# Patient Record
Sex: FEMALE | Race: WHITE | NOT HISPANIC OR LATINO | Employment: UNEMPLOYED | URBAN - METROPOLITAN AREA
[De-identification: names, ages, dates, MRNs, and addresses within clinical notes are randomized per-mention and may not be internally consistent; named-entity substitution may affect disease eponyms.]

---

## 2018-04-23 LAB
EXTERNAL HEPATITIS B SURFACE ANTIGEN: NEGATIVE
EXTERNAL RUBELLA QUALITATIVE: NORMAL
EXTERNAL SYPHILIS RPR SCREEN: NORMAL
HIV1 P24 AG SERPL QL IA: NORMAL

## 2018-11-12 LAB — EXTERNAL GROUP B STREP ANTIGEN: POSITIVE

## 2018-12-03 ENCOUNTER — APPOINTMENT (OUTPATIENT)
Dept: ULTRASOUND IMAGING | Facility: HOSPITAL | Age: 33
End: 2018-12-03

## 2018-12-03 ENCOUNTER — HOSPITAL ENCOUNTER (OUTPATIENT)
Facility: HOSPITAL | Age: 33
Setting detail: OBSERVATION
Discharge: HOME | End: 2018-12-03
Attending: OBSTETRICS & GYNECOLOGY | Admitting: OBSTETRICS & GYNECOLOGY

## 2018-12-03 VITALS — WEIGHT: 169 LBS | TEMPERATURE: 98.6 F | RESPIRATION RATE: 18 BRPM | BODY MASS INDEX: 27.16 KG/M2 | HEIGHT: 66 IN

## 2018-12-03 PROBLEM — Z34.90 PREGNANCY: Status: ACTIVE | Noted: 2018-12-03

## 2018-12-03 PROCEDURE — 96360 HYDRATION IV INFUSION INIT: CPT

## 2018-12-03 PROCEDURE — G0378 HOSPITAL OBSERVATION PER HR: HCPCS

## 2018-12-03 PROCEDURE — 76819 FETAL BIOPHYS PROFIL W/O NST: CPT

## 2018-12-03 PROCEDURE — 96361 HYDRATE IV INFUSION ADD-ON: CPT

## 2018-12-03 RX ORDER — PRENATAL VIT NO.126/IRON/FOLIC 28MG-0.8MG
1 TABLET ORAL DAILY
COMMUNITY
End: 2023-02-15

## 2018-12-03 RX ORDER — SERTRALINE HYDROCHLORIDE 25 MG/1
25 TABLET, FILM COATED ORAL DAILY
COMMUNITY
End: 2019-05-18

## 2018-12-03 RX ORDER — SODIUM CHLORIDE, SODIUM LACTATE, POTASSIUM CHLORIDE, CALCIUM CHLORIDE 600; 310; 30; 20 MG/100ML; MG/100ML; MG/100ML; MG/100ML
125 INJECTION, SOLUTION INTRAVENOUS CONTINUOUS
Status: DISCONTINUED | OUTPATIENT
Start: 2018-12-03 | End: 2018-12-03 | Stop reason: HOSPADM

## 2018-12-03 RX ORDER — OMEGA-3S/DHA/EPA/FISH OIL/D3 300MG-1000
400 CAPSULE ORAL DAILY
COMMUNITY
End: 2021-02-05

## 2018-12-03 RX ADMIN — SODIUM CHLORIDE, POTASSIUM CHLORIDE, SODIUM LACTATE AND CALCIUM CHLORIDE 125 ML/HR: 600; 310; 30; 20 INJECTION, SOLUTION INTRAVENOUS at 11:45

## 2018-12-03 NOTE — NURSING NOTE
Patient provided discharge instructions, and verbalizes understanding. Aware to return to triage for changes or concerns. Encouraged to keep prenatal appointments. Patient appears comfortable and ambulated off unit.

## 2018-12-03 NOTE — PROGRESS NOTES
Reviewed normal tracing in last hour.  Pt and  still prefer not to be induced.  Discussed [facility] if note a decrease in movement.  Also, labor warnings given, as well as to return for ROM or bleeding.  Will allow to go home and return to office 12/5 at 2 pm.      Ninfa Au MD  12/03/18  3:35 PM

## 2018-12-03 NOTE — PROGRESS NOTES
Pt sent from office with an audible decel.  Monitoring in-office with low baseline.  Sent for prolonged monitoring.  Cervix 60/1/-2.      NST here with good BTBV- one possible late decel.  An early may be present but discontinuous tracing.      Discussed with pt and  two options- keep for IOL since term and question on tracing vs. Continued monitoring with reassurance of normal BPP.  Explained if we see evidence of fetal compromise would want to keep and induce.  They really are not inclined to agree to IOL.  Pt having mild contractions per monitor, but not uncomfortable and not aware.      BPP is ordered but will keep on monitor otherwise.      Ninfa Au MD  12/03/18  1:14 PM

## 2018-12-03 NOTE — PROGRESS NOTES
"BPP 8/8, FAHAD 16.9.    NST now with great BTBV.  No decels in first 20 min on monitor.  Will allow to drink and eat.  Watch at least another hour.  Once again, they prefer no IOL.  Will note FM- they state baby is \"always\" active.  To notify if anything changes. Then would see in office 12/5 with BPP.  They understand that we usually deliver by 41 weeks.  There are painless contractions today.  Would hope for labor.    Ninfa Au MD  12/03/18  2:38 PM    "

## 2018-12-07 ENCOUNTER — ANESTHESIA (OUTPATIENT)
Dept: LABOR AND DELIVERY | Facility: HOSPITAL | Age: 33
End: 2018-12-07

## 2018-12-07 ENCOUNTER — ANESTHESIA EVENT (OUTPATIENT)
Dept: LABOR AND DELIVERY | Facility: HOSPITAL | Age: 33
End: 2018-12-07

## 2018-12-07 ENCOUNTER — HOSPITAL ENCOUNTER (INPATIENT)
Facility: HOSPITAL | Age: 33
LOS: 2 days | Discharge: HOME | End: 2018-12-09
Attending: OBSTETRICS & GYNECOLOGY | Admitting: OBSTETRICS & GYNECOLOGY

## 2018-12-07 PROBLEM — Z34.90 PREGNANCY: Status: RESOLVED | Noted: 2018-12-03 | Resolved: 2018-12-07

## 2018-12-07 LAB
ABO GROUP BLD: NORMAL
ATMOSPHERIC PRESS: 763.6 MMHG
BASE EXCESS BLDCOV CALC-SCNC: -2.7 MMOL/L (ref -30–30)
BASOPHILS # BLD AUTO: 0.03 10*3/MM3 (ref 0–0.2)
BASOPHILS NFR BLD AUTO: 0.2 % (ref 0–1.5)
BDY SITE: ABNORMAL
BLD GP AB SCN SERPL QL: NEGATIVE
DEPRECATED RDW RBC AUTO: 45.3 FL (ref 37–54)
EOSINOPHIL # BLD AUTO: 0.02 10*3/MM3 (ref 0–0.7)
EOSINOPHIL NFR BLD AUTO: 0.1 % (ref 0.3–6.2)
ERYTHROCYTE [DISTWIDTH] IN BLOOD BY AUTOMATED COUNT: 12.5 % (ref 11.7–13)
HCO3 BLDCOV-SCNC: 23.2 MMOL/L
HCT VFR BLD AUTO: 40.5 % (ref 35.6–45.5)
HGB BLD-MCNC: 13 G/DL (ref 11.9–15.5)
IMM GRANULOCYTES # BLD: 0.05 10*3/MM3 (ref 0–0.03)
IMM GRANULOCYTES NFR BLD: 0.3 % (ref 0–0.5)
LYMPHOCYTES # BLD AUTO: 1.46 10*3/MM3 (ref 0.9–4.8)
LYMPHOCYTES NFR BLD AUTO: 9.5 % (ref 19.6–45.3)
MCH RBC QN AUTO: 31.8 PG (ref 26.9–32)
MCHC RBC AUTO-ENTMCNC: 32.1 G/DL (ref 32.4–36.3)
MCV RBC AUTO: 99 FL (ref 80.5–98.2)
MODALITY: ABNORMAL
MONOCYTES # BLD AUTO: 0.72 10*3/MM3 (ref 0.2–1.2)
MONOCYTES NFR BLD AUTO: 4.7 % (ref 5–12)
NEUTROPHILS # BLD AUTO: 13.1 10*3/MM3 (ref 1.9–8.1)
NEUTROPHILS NFR BLD AUTO: 85.2 % (ref 42.7–76)
NOTE: ABNORMAL
PCO2 BLDCOV: 43.2 MM HG (ref 35–51.3)
PH BLDCOV: 7.34 PH UNITS (ref 7.26–7.4)
PLATELET # BLD AUTO: 221 10*3/MM3 (ref 140–500)
PMV BLD AUTO: 11.4 FL (ref 6–12)
PO2 BLDCOV: 18.7 MM HG (ref 19–39)
RBC # BLD AUTO: 4.09 10*6/MM3 (ref 3.9–5.2)
RH BLD: POSITIVE
SAO2 % BLDCOA: 25.5 % (ref 92–99)
SAO2 % BLDCOV: ABNORMAL %
T&S EXPIRATION DATE: NORMAL
WBC NRBC COR # BLD: 15.38 10*3/MM3 (ref 4.5–10.7)

## 2018-12-07 PROCEDURE — 86901 BLOOD TYPING SEROLOGIC RH(D): CPT | Performed by: OBSTETRICS & GYNECOLOGY

## 2018-12-07 PROCEDURE — 85025 COMPLETE CBC W/AUTO DIFF WBC: CPT | Performed by: OBSTETRICS & GYNECOLOGY

## 2018-12-07 PROCEDURE — 25010000002 PENICILLIN G POTASSIUM PER 600000 UNITS: Performed by: OBSTETRICS & GYNECOLOGY

## 2018-12-07 PROCEDURE — 86850 RBC ANTIBODY SCREEN: CPT | Performed by: OBSTETRICS & GYNECOLOGY

## 2018-12-07 PROCEDURE — 82803 BLOOD GASES ANY COMBINATION: CPT

## 2018-12-07 PROCEDURE — 10907ZC DRAINAGE OF AMNIOTIC FLUID, THERAPEUTIC FROM PRODUCTS OF CONCEPTION, VIA NATURAL OR ARTIFICIAL OPENING: ICD-10-PCS | Performed by: OBSTETRICS & GYNECOLOGY

## 2018-12-07 PROCEDURE — 86900 BLOOD TYPING SEROLOGIC ABO: CPT | Performed by: OBSTETRICS & GYNECOLOGY

## 2018-12-07 PROCEDURE — C1755 CATHETER, INTRASPINAL: HCPCS | Performed by: ANESTHESIOLOGY

## 2018-12-07 PROCEDURE — 0KQM0ZZ REPAIR PERINEUM MUSCLE, OPEN APPROACH: ICD-10-PCS | Performed by: OBSTETRICS & GYNECOLOGY

## 2018-12-07 RX ORDER — PENICILLIN G 3000000 [IU]/50ML
3 INJECTION, SOLUTION INTRAVENOUS EVERY 4 HOURS
Status: DISCONTINUED | OUTPATIENT
Start: 2018-12-07 | End: 2018-12-08 | Stop reason: HOSPADM

## 2018-12-07 RX ORDER — FAMOTIDINE 10 MG/ML
20 INJECTION, SOLUTION INTRAVENOUS ONCE AS NEEDED
Status: DISCONTINUED | OUTPATIENT
Start: 2018-12-07 | End: 2018-12-08 | Stop reason: HOSPADM

## 2018-12-07 RX ORDER — CARBOPROST TROMETHAMINE 250 UG/ML
250 INJECTION, SOLUTION INTRAMUSCULAR AS NEEDED
Status: DISCONTINUED | OUTPATIENT
Start: 2018-12-07 | End: 2018-12-08 | Stop reason: HOSPADM

## 2018-12-07 RX ORDER — ONDANSETRON 4 MG/1
4 TABLET, ORALLY DISINTEGRATING ORAL EVERY 6 HOURS PRN
Status: DISCONTINUED | OUTPATIENT
Start: 2018-12-07 | End: 2018-12-08 | Stop reason: HOSPADM

## 2018-12-07 RX ORDER — METHYLERGONOVINE MALEATE 0.2 MG/ML
200 INJECTION INTRAVENOUS ONCE AS NEEDED
Status: DISCONTINUED | OUTPATIENT
Start: 2018-12-07 | End: 2018-12-08 | Stop reason: HOSPADM

## 2018-12-07 RX ORDER — OXYTOCIN-SODIUM CHLORIDE 0.9% IV SOLN 30 UNIT/500ML 30-0.9/5 UT/ML-%
999 SOLUTION INTRAVENOUS ONCE
Status: COMPLETED | OUTPATIENT
Start: 2018-12-07 | End: 2018-12-07

## 2018-12-07 RX ORDER — ONDANSETRON 4 MG/1
4 TABLET, FILM COATED ORAL EVERY 6 HOURS PRN
Status: DISCONTINUED | OUTPATIENT
Start: 2018-12-07 | End: 2018-12-08 | Stop reason: HOSPADM

## 2018-12-07 RX ORDER — ONDANSETRON 2 MG/ML
4 INJECTION INTRAMUSCULAR; INTRAVENOUS ONCE AS NEEDED
Status: DISCONTINUED | OUTPATIENT
Start: 2018-12-07 | End: 2018-12-08 | Stop reason: HOSPADM

## 2018-12-07 RX ORDER — PHYTONADIONE 1 MG/.5ML
INJECTION, EMULSION INTRAMUSCULAR; INTRAVENOUS; SUBCUTANEOUS
Status: DISPENSED
Start: 2018-12-07 | End: 2018-12-08

## 2018-12-07 RX ORDER — ONDANSETRON 2 MG/ML
4 INJECTION INTRAMUSCULAR; INTRAVENOUS EVERY 6 HOURS PRN
Status: DISCONTINUED | OUTPATIENT
Start: 2018-12-07 | End: 2018-12-08 | Stop reason: HOSPADM

## 2018-12-07 RX ORDER — SODIUM CHLORIDE, SODIUM LACTATE, POTASSIUM CHLORIDE, CALCIUM CHLORIDE 600; 310; 30; 20 MG/100ML; MG/100ML; MG/100ML; MG/100ML
125 INJECTION, SOLUTION INTRAVENOUS CONTINUOUS
Status: DISCONTINUED | OUTPATIENT
Start: 2018-12-07 | End: 2018-12-09 | Stop reason: HOSPADM

## 2018-12-07 RX ORDER — MISOPROSTOL 200 UG/1
800 TABLET ORAL AS NEEDED
Status: DISCONTINUED | OUTPATIENT
Start: 2018-12-07 | End: 2018-12-08 | Stop reason: HOSPADM

## 2018-12-07 RX ORDER — TERBUTALINE SULFATE 1 MG/ML
0.25 INJECTION, SOLUTION SUBCUTANEOUS AS NEEDED
Status: DISCONTINUED | OUTPATIENT
Start: 2018-12-07 | End: 2018-12-08 | Stop reason: HOSPADM

## 2018-12-07 RX ORDER — EPHEDRINE SULFATE 50 MG/ML
5 INJECTION, SOLUTION INTRAVENOUS AS NEEDED
Status: DISCONTINUED | OUTPATIENT
Start: 2018-12-07 | End: 2018-12-08 | Stop reason: HOSPADM

## 2018-12-07 RX ORDER — OXYTOCIN-SODIUM CHLORIDE 0.9% IV SOLN 30 UNIT/500ML 30-0.9/5 UT/ML-%
250 SOLUTION INTRAVENOUS CONTINUOUS
Status: ACTIVE | OUTPATIENT
Start: 2018-12-07 | End: 2018-12-07

## 2018-12-07 RX ORDER — OXYTOCIN-SODIUM CHLORIDE 0.9% IV SOLN 30 UNIT/500ML 30-0.9/5 UT/ML-%
125 SOLUTION INTRAVENOUS CONTINUOUS PRN
Status: COMPLETED | OUTPATIENT
Start: 2018-12-07 | End: 2018-12-07

## 2018-12-07 RX ORDER — ERYTHROMYCIN 5 MG/G
OINTMENT OPHTHALMIC
Status: DISPENSED
Start: 2018-12-07 | End: 2018-12-08

## 2018-12-07 RX ADMIN — PENICILLIN G 3 MILLION UNITS: 3000000 INJECTION, SOLUTION INTRAVENOUS at 19:47

## 2018-12-07 RX ADMIN — SODIUM CHLORIDE, POTASSIUM CHLORIDE, SODIUM LACTATE AND CALCIUM CHLORIDE 125 ML/HR: 600; 310; 30; 20 INJECTION, SOLUTION INTRAVENOUS at 09:36

## 2018-12-07 RX ADMIN — PENICILLIN G 3 MILLION UNITS: 3000000 INJECTION, SOLUTION INTRAVENOUS at 09:36

## 2018-12-07 RX ADMIN — SODIUM CHLORIDE, POTASSIUM CHLORIDE, SODIUM LACTATE AND CALCIUM CHLORIDE 125 ML/HR: 600; 310; 30; 20 INJECTION, SOLUTION INTRAVENOUS at 05:38

## 2018-12-07 RX ADMIN — OXYTOCIN 125 ML/HR: 10 INJECTION, SOLUTION INTRAMUSCULAR; INTRAVENOUS at 22:43

## 2018-12-07 RX ADMIN — PENICILLIN G 3 MILLION UNITS: 3000000 INJECTION, SOLUTION INTRAVENOUS at 17:59

## 2018-12-07 RX ADMIN — Medication 8 ML/HR: at 11:00

## 2018-12-07 RX ADMIN — OXYTOCIN 999 ML/HR: 10 INJECTION, SOLUTION INTRAMUSCULAR; INTRAVENOUS at 22:06

## 2018-12-07 RX ADMIN — PENICILLIN G 3 MILLION UNITS: 3000000 INJECTION, SOLUTION INTRAVENOUS at 13:43

## 2018-12-07 RX ADMIN — SODIUM CHLORIDE 5 MILLION UNITS: 900 INJECTION INTRAVENOUS at 05:37

## 2018-12-07 RX ADMIN — SODIUM CHLORIDE, POTASSIUM CHLORIDE, SODIUM LACTATE AND CALCIUM CHLORIDE 125 ML/HR: 600; 310; 30; 20 INJECTION, SOLUTION INTRAVENOUS at 12:49

## 2018-12-07 NOTE — PLAN OF CARE
Problem: Patient Care Overview  Goal: Plan of Care Review  Outcome: Ongoing (interventions implemented as appropriate)   12/07/18 0604 12/07/18 1822   Coping/Psychosocial   Plan of Care Reviewed With patient;spouse --    Plan of Care Review   Progress improving --    OTHER   Outcome Summary --  pt making cervical change. Pain 0/10. will start pushing soon.      Goal: Individualization and Mutuality  Outcome: Ongoing (interventions implemented as appropriate)   12/07/18 0604   Individualization   Patient Specific Preferences MAN, breastfeeding, birthing ball, possible NCB   Patient Specific Goals (Include Timeframe) healthy mom and baby   Patient Specific Interventions MAN during recovery, birthing ball if wanted   Mutuality/Individual Preferences   What Anxieties, Fears, Concerns, or Questions Do You Have About Your Care? labor process, pain control   How Would You and/or Your Support Person Like to Participate in Your Care? FOB in room for delivery, FOB to cut cord   Mutuality/Individual Preferences   How to Address Anxieties/Fears keep informed, education     Goal: Discharge Needs Assessment  Outcome: Ongoing (interventions implemented as appropriate)   12/07/18 0604   Discharge Needs Assessment   Readmission Within the Last 30 Days no previous admission in last 30 days   Concerns to be Addressed no discharge needs identified   Patient/Family Anticipates Transition to home;home with family   Patient/Family Anticipated Services at Transition none   Transportation Concerns car, none   Transportation Anticipated car, drives self;family or friend will provide   Anticipated Changes Related to Illness none   Equipment Needed After Discharge none   Disability   Equipment Currently Used at Home none       Problem: Labor (Cervical Ripen, Induct, Augment) (Adult,Obstetrics,Pediatric)  Goal: Signs and Symptoms of Listed Potential Problems Will be Absent, Minimized or Managed (Labor)  Outcome: Ongoing (interventions implemented  as appropriate)   18 06   Goal/Outcome Evaluation   Problems Assessed (Labor) all   Problems Present (Labor) intrapartum bleeding;pain       Problem: Skin Injury Risk (Adult)  Goal: Identify Related Risk Factors and Signs and Symptoms  Outcome: Ongoing (interventions implemented as appropriate)   18 06   Skin Injury Risk (Adult)   Related Risk Factors (Skin Injury Risk) mobility impaired     Goal: Skin Health and Integrity  Outcome: Ongoing (interventions implemented as appropriate)   18   Skin Injury Risk (Adult)   Skin Health and Integrity making progress toward outcome       Problem: Fall Risk,  (Adult,Obstetrics,Pediatric)  Goal: Identify Related Risk Factors and Signs and Symptoms  Outcome: Ongoing (interventions implemented as appropriate)   18 06   Fall Risk,  (Adult,Obstetrics,Pediatric)   Related Risk Factors (Fall Risk, ) depression;fatigue;pain severe;unable to visualize feet   Signs and Symptoms (Fall Risk, ) presence of fall risk factors     Goal: Absence of Maternal Fall  Outcome: Ongoing (interventions implemented as appropriate)   18 1822   Fall Risk,  (Adult,Obstetrics,Pediatric)   Absence of Maternal Fall making progress toward outcome

## 2018-12-07 NOTE — ANESTHESIA PREPROCEDURE EVALUATION
Anesthesia Evaluation     Patient summary reviewed and Nursing notes reviewed                Airway   Mallampati: II  TM distance: >3 FB  Neck ROM: full  Dental - normal exam     Pulmonary - negative pulmonary ROS and normal exam    breath sounds clear to auscultation  Cardiovascular - negative cardio ROS and normal exam  Exercise tolerance: good (4-7 METS)    Rhythm: regular  Rate: normal    (-) angina, orthopnea, PND, DAVIDSON      Neuro/Psych- negative ROS  GI/Hepatic/Renal/Endo - negative ROS     Musculoskeletal (-) negative ROS    Abdominal    Substance History - negative use     OB/GYN    (+) Pregnant,         Other - negative ROS                       Anesthesia Plan    ASA 2     epidural     Anesthetic plan, all risks, benefits, and alternatives have been provided, discussed and informed consent has been obtained with: patient.

## 2018-12-07 NOTE — PLAN OF CARE
Problem: Patient Care Overview  Goal: Plan of Care Review  Outcome: Ongoing (interventions implemented as appropriate)   12/07/18 0604   Coping/Psychosocial   Plan of Care Reviewed With patient;spouse   Plan of Care Review   Progress improving   OTHER   Outcome Summary SVE at 0520 showed pt. to be 4-5/90/-1. Pt. would like to try to have NCB.     Goal: Individualization and Mutuality  Outcome: Ongoing (interventions implemented as appropriate)   12/07/18 0604   Individualization   Patient Specific Preferences MAN, breastfeeding, birthing ball, possible NCB   Patient Specific Goals (Include Timeframe) healthy mom and baby   Patient Specific Interventions MAN during recovery, birthing ball if wanted   Mutuality/Individual Preferences   What Anxieties, Fears, Concerns, or Questions Do You Have About Your Care? labor process, pain control   How Would You and/or Your Support Person Like to Participate in Your Care? FOB in room for delivery, FOB to cut cord   Mutuality/Individual Preferences   How to Address Anxieties/Fears keep informed, education     Goal: Discharge Needs Assessment  Outcome: Ongoing (interventions implemented as appropriate)   12/07/18 0604   Discharge Needs Assessment   Readmission Within the Last 30 Days no previous admission in last 30 days   Concerns to be Addressed no discharge needs identified   Patient/Family Anticipates Transition to home;home with family   Patient/Family Anticipated Services at Transition none   Transportation Concerns car, none   Transportation Anticipated car, drives self;family or friend will provide   Anticipated Changes Related to Illness none   Equipment Needed After Discharge none   Disability   Equipment Currently Used at Home none     Goal: Interprofessional Rounds/Family Conf  Outcome: Ongoing (interventions implemented as appropriate)   12/07/18 0604   Interdisciplinary Rounds/Family Conf   Participants nursing;patient;other (see comments)  (spouse)       Problem:  Labor (Cervical Ripen, Induct, Augment) (Adult,Obstetrics,Pediatric)  Goal: Signs and Symptoms of Listed Potential Problems Will be Absent, Minimized or Managed (Labor)  Outcome: Ongoing (interventions implemented as appropriate)   18   Goal/Outcome Evaluation   Problems Assessed (Labor) all   Problems Present (Labor) intrapartum bleeding;pain       Problem: Skin Injury Risk (Adult)  Goal: Identify Related Risk Factors and Signs and Symptoms  Outcome: Ongoing (interventions implemented as appropriate)   18   Skin Injury Risk (Adult)   Related Risk Factors (Skin Injury Risk) mobility impaired     Goal: Skin Health and Integrity  Outcome: Ongoing (interventions implemented as appropriate)   18   Skin Injury Risk (Adult)   Skin Health and Integrity making progress toward outcome       Problem: Fall Risk,  (Adult,Obstetrics,Pediatric)  Goal: Identify Related Risk Factors and Signs and Symptoms  Outcome: Ongoing (interventions implemented as appropriate)   18   Fall Risk,  (Adult,Obstetrics,Pediatric)   Related Risk Factors (Fall Risk, ) depression;fatigue;pain severe;unable to visualize feet   Signs and Symptoms (Fall Risk, ) presence of fall risk factors     Goal: Absence of Maternal Fall  Outcome: Ongoing (interventions implemented as appropriate)   18   Fall Risk,  (Adult,Obstetrics,Pediatric)   Absence of Maternal Fall making progress toward outcome

## 2018-12-07 NOTE — ANESTHESIA PROCEDURE NOTES
Labor Epidural    Pre-sedation assessment completed: 12/7/2018 10:54 AM    Patient reassessed immediately prior to procedure    Patient location during procedure: OB  Start Time: 12/7/2018 10:55 AM  Stop Time: 12/7/2018 10:57 AM  Performed By  Anesthesiologist: Arnoldo Barlow MD  Preanesthetic Checklist  Completed: patient identified, site marked, surgical consent, pre-op evaluation, timeout performed, IV checked, risks and benefits discussed and monitors and equipment checked  Additional Notes  Labor epidural using Arrow kit, no heme/CSF aspirated, no paraesthesias.  Test dose with 3cc 1.5% lido with epi is negative.    Prep:  Pt Position:sitting  Sterile Tech:cap, gloves, mask and sterile barrier  Prep:chlorhexidine gluconate and isopropyl alcohol  Monitoring:blood pressure monitoring, continuous pulse oximetry and EKG  Epidural Block Procedure:  Approach:midline  Guidance:landmark technique and palpation technique  Location:L3-L4  Needle Type:Tuohy  Needle Gauge:17  Loss of Resistance Medium: saline  Loss of Resistance: 5cm  Cath Depth at skin:10 cm  Paresthesia: none  Aspiration:negative  Test Dose:negative  Number of Attempts: 1  Post Assessment:  Dressing:occlusive dressing applied and secured with tape  Pt Tolerance:patient tolerated the procedure well with no apparent complications  Complications:no

## 2018-12-08 LAB
BASOPHILS # BLD AUTO: 0.02 10*3/MM3 (ref 0–0.2)
BASOPHILS NFR BLD AUTO: 0.1 % (ref 0–1.5)
DEPRECATED RDW RBC AUTO: 43.9 FL (ref 37–54)
EOSINOPHIL # BLD AUTO: 0.02 10*3/MM3 (ref 0–0.7)
EOSINOPHIL NFR BLD AUTO: 0.1 % (ref 0.3–6.2)
ERYTHROCYTE [DISTWIDTH] IN BLOOD BY AUTOMATED COUNT: 12.7 % (ref 11.7–13)
HCT VFR BLD AUTO: 31.9 % (ref 35.6–45.5)
HGB BLD-MCNC: 10.4 G/DL (ref 11.9–15.5)
IMM GRANULOCYTES # BLD: 0.04 10*3/MM3 (ref 0–0.03)
IMM GRANULOCYTES NFR BLD: 0.2 % (ref 0–0.5)
LYMPHOCYTES # BLD AUTO: 1.66 10*3/MM3 (ref 0.9–4.8)
LYMPHOCYTES NFR BLD AUTO: 9.6 % (ref 19.6–45.3)
MCH RBC QN AUTO: 31.1 PG (ref 26.9–32)
MCHC RBC AUTO-ENTMCNC: 32.6 G/DL (ref 32.4–36.3)
MCV RBC AUTO: 95.5 FL (ref 80.5–98.2)
MONOCYTES # BLD AUTO: 0.77 10*3/MM3 (ref 0.2–1.2)
MONOCYTES NFR BLD AUTO: 4.5 % (ref 5–12)
NEUTROPHILS # BLD AUTO: 14.77 10*3/MM3 (ref 1.9–8.1)
NEUTROPHILS NFR BLD AUTO: 85.7 % (ref 42.7–76)
PLATELET # BLD AUTO: 181 10*3/MM3 (ref 140–500)
PMV BLD AUTO: 11.5 FL (ref 6–12)
RBC # BLD AUTO: 3.34 10*6/MM3 (ref 3.9–5.2)
WBC NRBC COR # BLD: 17.24 10*3/MM3 (ref 4.5–10.7)

## 2018-12-08 PROCEDURE — 85025 COMPLETE CBC W/AUTO DIFF WBC: CPT | Performed by: OBSTETRICS & GYNECOLOGY

## 2018-12-08 RX ORDER — ACETAMINOPHEN 325 MG/1
650 TABLET ORAL EVERY 4 HOURS PRN
Status: DISCONTINUED | OUTPATIENT
Start: 2018-12-08 | End: 2018-12-09 | Stop reason: HOSPADM

## 2018-12-08 RX ORDER — ONDANSETRON 4 MG/1
4 TABLET, FILM COATED ORAL EVERY 6 HOURS PRN
Status: DISCONTINUED | OUTPATIENT
Start: 2018-12-08 | End: 2018-12-09 | Stop reason: HOSPADM

## 2018-12-08 RX ORDER — OXYCODONE HYDROCHLORIDE AND ACETAMINOPHEN 5; 325 MG/1; MG/1
2 TABLET ORAL EVERY 4 HOURS PRN
Status: DISCONTINUED | OUTPATIENT
Start: 2018-12-08 | End: 2018-12-09 | Stop reason: HOSPADM

## 2018-12-08 RX ORDER — ONDANSETRON 4 MG/1
4 TABLET, ORALLY DISINTEGRATING ORAL EVERY 6 HOURS PRN
Status: DISCONTINUED | OUTPATIENT
Start: 2018-12-08 | End: 2018-12-09 | Stop reason: HOSPADM

## 2018-12-08 RX ORDER — PROMETHAZINE HYDROCHLORIDE 25 MG/1
12.5 SUPPOSITORY RECTAL EVERY 6 HOURS PRN
Status: DISCONTINUED | OUTPATIENT
Start: 2018-12-08 | End: 2018-12-09 | Stop reason: HOSPADM

## 2018-12-08 RX ORDER — MISOPROSTOL 200 UG/1
600 TABLET ORAL ONCE AS NEEDED
Status: DISCONTINUED | OUTPATIENT
Start: 2018-12-08 | End: 2018-12-09 | Stop reason: HOSPADM

## 2018-12-08 RX ORDER — ONDANSETRON 2 MG/ML
4 INJECTION INTRAMUSCULAR; INTRAVENOUS EVERY 6 HOURS PRN
Status: DISCONTINUED | OUTPATIENT
Start: 2018-12-08 | End: 2018-12-09 | Stop reason: HOSPADM

## 2018-12-08 RX ORDER — OXYCODONE HYDROCHLORIDE AND ACETAMINOPHEN 5; 325 MG/1; MG/1
1 TABLET ORAL EVERY 4 HOURS PRN
Status: DISCONTINUED | OUTPATIENT
Start: 2018-12-08 | End: 2018-12-09 | Stop reason: HOSPADM

## 2018-12-08 RX ORDER — SERTRALINE HYDROCHLORIDE 25 MG/1
25 TABLET, FILM COATED ORAL DAILY
Status: DISCONTINUED | OUTPATIENT
Start: 2018-12-08 | End: 2018-12-09 | Stop reason: HOSPADM

## 2018-12-08 RX ORDER — DOCUSATE SODIUM 100 MG/1
100 CAPSULE, LIQUID FILLED ORAL 2 TIMES DAILY
Status: DISCONTINUED | OUTPATIENT
Start: 2018-12-08 | End: 2018-12-09 | Stop reason: HOSPADM

## 2018-12-08 RX ORDER — IBUPROFEN 800 MG/1
800 TABLET ORAL EVERY 8 HOURS PRN
Status: DISCONTINUED | OUTPATIENT
Start: 2018-12-08 | End: 2018-12-09 | Stop reason: HOSPADM

## 2018-12-08 RX ORDER — PROMETHAZINE HYDROCHLORIDE 25 MG/1
25 TABLET ORAL EVERY 6 HOURS PRN
Status: DISCONTINUED | OUTPATIENT
Start: 2018-12-08 | End: 2018-12-09 | Stop reason: HOSPADM

## 2018-12-08 RX ORDER — PROMETHAZINE HYDROCHLORIDE 25 MG/ML
12.5 INJECTION, SOLUTION INTRAMUSCULAR; INTRAVENOUS EVERY 6 HOURS PRN
Status: DISCONTINUED | OUTPATIENT
Start: 2018-12-08 | End: 2018-12-09 | Stop reason: HOSPADM

## 2018-12-08 RX ADMIN — DOCUSATE SODIUM 100 MG: 100 CAPSULE, LIQUID FILLED ORAL at 08:29

## 2018-12-08 RX ADMIN — DOCUSATE SODIUM 100 MG: 100 CAPSULE, LIQUID FILLED ORAL at 20:00

## 2018-12-08 RX ADMIN — Medication: at 02:43

## 2018-12-08 RX ADMIN — IBUPROFEN 800 MG: 800 TABLET ORAL at 02:43

## 2018-12-08 RX ADMIN — OXYCODONE AND ACETAMINOPHEN 1 TABLET: 5; 325 TABLET ORAL at 15:56

## 2018-12-08 RX ADMIN — SERTRALINE 25 MG: 25 TABLET, FILM COATED ORAL at 12:41

## 2018-12-08 RX ADMIN — IBUPROFEN 800 MG: 800 TABLET ORAL at 11:00

## 2018-12-08 RX ADMIN — IBUPROFEN 800 MG: 800 TABLET ORAL at 20:00

## 2018-12-08 NOTE — PROGRESS NOTES
[facility]  Vaginal Delivery Progress Note    Patient Name: Aracelis Mcrae  :  1985  MRN:  [id number]      Subjective   Postpartum Day 1: Vaginal Delivery of a female infant.     The patient feels well without complaints.  Her pain is well controlled.  Reports normal lochia.     The patient plans to breastfeed.    Objective     Vital Signs Range for the last 24 hours  Temperature: Temp:  [97.9 °F (36.6 °C)-99.2 °F (37.3 °C)] 98 °F (36.7 °C)       BP: BP: ()/(50-90) 104/64   Pulse: Heart Rate:  [] 84   Respirations: Resp:  [16-20] 18                       Physical Exam:  General: Awake and alert  Abdomen: Fundus: firm, non tender, 1 below umbilicus  Extremities:  trace edema, NT     Labs:     Results from last 7 days   Lab Units  18   0539  18   0528   WBC 10*3/mm3  17.24*  15.38*   HEMOGLOBIN g/dL  10.4*  13.0   HEMATOCRIT %  31.9*  40.5   PLATELETS 10*3/mm3  181  221       Prenatal labs results reviewed:  Yes   Rubella:  immune  Rh Status:    RH type   Date Value Ref Range Status   2018 Positive  Final         Assessment/Plan  : 1. PPD1 S/P  - Doing well, continue usual cares. Needs order for zoloft 25mg took during pregnancy          * No active hospital problems. *          SHEREEN Pat  2018  9:18 AM     Patient seen and examined. Agree with above assessment.   Aria Marks MD  2018  4:24 PM

## 2018-12-08 NOTE — ANESTHESIA POSTPROCEDURE EVALUATION
Patient: Aracelis Mcrae    Procedure Summary     Date:  12/07/18 Room / Location:      Anesthesia Start:  1034 Anesthesia Stop:  2203    Procedure:  LABOR ANALGESIA Diagnosis:      Scheduled Providers:   Provider:  Arnoldo Barlow MD    Anesthesia Type:  epidural ASA Status:  2          Anesthesia Type: epidural  Last vitals  BP   104/64 (12/08/18 0710)   Temp   36.7 °C (98 °F) (12/08/18 0710)   Pulse   84 (12/08/18 0710)   Resp   18 (12/08/18 0710)     SpO2         Post Anesthesia Care and Evaluation    Patient location during evaluation: bedside  Patient participation: complete - patient participated  Level of consciousness: awake and alert  Pain management: adequate  Airway patency: patent  Anesthetic complications: No anesthetic complications    Cardiovascular status: acceptable  Respiratory status: acceptable  Hydration status: acceptable    Comments: /64 (BP Location: Right arm, Patient Position: Sitting)   Pulse 84   Temp 36.7 °C (98 °F) (Oral)   Resp 18   Wt 76.1 kg (167 lb 12.8 oz)   Breastfeeding? Yes   BMI 27.08 kg/m²

## 2018-12-08 NOTE — LACTATION NOTE
P1.Baby girl is wide awake and cueing but comes to breast to play and coming on and off. Mom has well everted nipples and great blue veining.Given hand pump with instructions for use and cleaning. Will call for help with syringe feeding as needed.    Lactation Consult Note    Evaluation Completed: 2018 1:05 PM  Patient Name: Aracelis Mcrae  :  1985  MRN:  [id number]     REFERRAL  INFORMATION:                          Date of Referral: 18   Person Making Referral: patient  Maternal Reason for Referral: breastfeeding currently       DELIVERY HISTORY:          Skin to skin initiation date/time: 2018  10:03 PM   Skin to skin end date/time:              MATERNAL ASSESSMENT:  Breast Size Issue: none (18 1300 : Aleida Green RN)  Breast Shape: round (18 1300 : Aleida Green RN)     Areola: elastic (18 1300 : Aleida Green RN)  Nipples: everted (18 1300 : Aleida Green RN)                INFANT ASSESSMENT:  Information for the patient's :  Martine Mcrae [[id number]]   No past medical history on file.                                                                                                                                MATERNAL INFANT FEEDING:  Maternal Preparation: breast care (18 1300 : Aleida Green RN)  Maternal Emotional State: assist needed (18 1300 : Aleida Green, RN)  Infant Positioning: cross-cradle, clutch/football (18 1300 : Aleida Green, RN)                  Milk Ejection Reflex: present (18 1300 : Aleida Green, RN)           Latch Assistance: yes (18 1300 : Aleida Green, RN)                               EQUIPMENT TYPE:  Breast Pump Type: manual (18 1300 : Aleida Green, RN)                              BREAST PUMPING:          LACTATION REFERRALS:

## 2018-12-08 NOTE — L&D DELIVERY NOTE
[facility]  Vaginal Delivery Note    Patient Name: Aracelis Mcrae  :  1985  MRN:  [id number]      Delivery     Delivery: Vaginal, Spontaneous     YOB: 2016    Time of Birth: 10:03 PM      Anesthesia: Epidural     Delivering clinician: Ninfa Au MD       Infant    Findings: Viable female  infant    Infant observations: Weight: No birth weight on file.   Observations/Comments:  scale 2      Apgars:    @ 1 minute /       @ 5 minutes     Placenta, Cord, and Fluid    Placenta delivered  Spontaneous   at  2018 10:06 PM     Cord: 3 vessels  present.   Cord blood obtained: Yes    Cord gases obtained:         Repair    Episiotomy: No   Lacerations: Second degree midline   Estimated Blood Loss: 500 500 mls.       Delivery narrative: The patient is a 33 y.o.  at 40w3d.  Presented in labor.  Received epidural and progressed to C/C/+1.  Allowed AROM at that time and meconium stained fluid was noted. Labored down an hour. Fetal status reassuring throughout.  of viable female infant  @ 10:03 PM  over an intact perineum. ASDE. No birth weight on file. .  Placenta delivered spontaneously, intact with 3 vessel cord. Cervix and rectum intact. Second degree laceration repaired in usual fashion with 3-0 monocryl suture. Mother and baby recovering good condition.       Ninfa Au MD  18  12:55 AM

## 2018-12-08 NOTE — LACTATION NOTE
P1. Baby Girl cueing like crazy and will not latch to breast . After a lick or two baby goes to sleep. Started hand pump for colostrum to feed infant. Reassurance given .

## 2018-12-09 VITALS
WEIGHT: 167.8 LBS | HEART RATE: 76 BPM | TEMPERATURE: 98.1 F | RESPIRATION RATE: 18 BRPM | BODY MASS INDEX: 27.08 KG/M2 | SYSTOLIC BLOOD PRESSURE: 114 MMHG | DIASTOLIC BLOOD PRESSURE: 71 MMHG

## 2018-12-09 RX ORDER — IBUPROFEN 800 MG/1
800 TABLET ORAL EVERY 8 HOURS PRN
Qty: 50 TABLET | Refills: 3 | Status: SHIPPED | OUTPATIENT
Start: 2018-12-09 | End: 2021-02-05

## 2018-12-09 RX ORDER — OXYCODONE HYDROCHLORIDE AND ACETAMINOPHEN 5; 325 MG/1; MG/1
1 TABLET ORAL EVERY 4 HOURS PRN
Qty: 30 TABLET | Refills: 0 | Status: SHIPPED | OUTPATIENT
Start: 2018-12-09 | End: 2018-12-18

## 2018-12-09 RX ADMIN — IBUPROFEN 800 MG: 800 TABLET ORAL at 05:35

## 2018-12-09 RX ADMIN — DOCUSATE SODIUM 100 MG: 100 CAPSULE, LIQUID FILLED ORAL at 09:12

## 2018-12-09 RX ADMIN — IBUPROFEN 800 MG: 800 TABLET ORAL at 13:38

## 2018-12-09 RX ADMIN — SERTRALINE 25 MG: 25 TABLET, FILM COATED ORAL at 09:13

## 2018-12-09 NOTE — LACTATION NOTE
P1 baby Radha has not improved her sucking and still is not nursing. Parents are exhausted and overwhelmed. PLAN B :     Pump with HGP q 2-3 hours and all EBM will be fed to infant.    Syringe/finger feed 30cc formula /EBM to Radha q 3hours.    Ask pediatrician to request consult with Vika Santos in Speech therapy.    Lactation Consult Note    Evaluation Completed: 2018 9:45 AM  Patient Name: Aracelis Mcrae  :  1985  MRN:  [id number]     REFERRAL  INFORMATION:                          Date of Referral: 18   Person Making Referral: patient, nurse  Maternal Reason for Referral: breastfeeding currently  Infant Reason for Referral: no latch within 24 hours    DELIVERY HISTORY:          Skin to skin initiation date/time: 2018  10:03 PM   Skin to skin end date/time:              MATERNAL ASSESSMENT:  Breast Size Issue: none (18 : Aleida Green RN)  Breast Shape: Bilateral:, round, other (see comments)(great blue veining) (18 : Aleida Green, RN)  Breast Density: filling (18 : Aleida Green, RN)  Areola: dense (18 : Aleida Green, RN)  Nipples: everted (18 : Aleida Green, RN)                INFANT ASSESSMENT:  Information for the patient's :  Martine Mcrae [[id number]]   No past medical history on file.                                                                                                                                MATERNAL INFANT FEEDING:  Maternal Preparation: breast care (18 : Aleida Green, RN)  Maternal Emotional State: assist needed (18 : Aleida Green, RN)  Infant Positioning: laid back (ventral), side-lying (18 : Aleida Green, RN)                  Milk Ejection Reflex: present (18 : Aleida Green, RN)           Latch Assistance: yes (18 : Aleida Green, RN)    Additional Documentation:  Breastfeeding Supplementation (Group) (12/09/18 0934 : Aleida Green, RN)     Infant Indication for Supplementation: per provider order, oral/motor dysfunction (12/09/18 0934 : Aleida Green, RN)  Breastfeeding Supplementation Type: expressed breast milk, formula (12/09/18 0934 : Aleida Green, RN)  Method of Supplementation: finger (12/09/18 0934 : Aleida Green, RN)              EQUIPMENT TYPE:  Breast Pump Type: double electric, hospital grade (12/09/18 0934 : Aleida Green, RN)  Breast Pump Flange Type: hard (12/09/18 0934 : Aleida Green, RN)  Breast Pump Flange Size: 24 mm, 27 mm (12/09/18 0934 : Aleida Green, RN)                        BREAST PUMPING:  Breast Pumping Interventions: early pumping promoted, frequent pumping encouraged (12/09/18 0934 : Aleida Green, RN)       LACTATION REFERRALS:  Lactation Referrals: outpatient lactation program (12/09/18 0934 : Aleida Green, RN)

## 2018-12-09 NOTE — DISCHARGE SUMMARY
Date of Discharge:  2018    Discharge Diagnosis: Term pregnancy, delivered    Presenting Problem/History of Present Illness  Pregnancy [Z34.90]       Hospital Course  Patient is a 33 y.o. female presented with labor and delivered vaginally.  Recovered well.      Procedures Performed  Vaginal delivery       Consults:   Consults     Date and Time Order Name Status Description    2018 0504 Inpatient Anesthesiology Consult            Condition on Discharge:   Subjective   Postpartum Day 2 Vaginal Delivery.    The patient feels well without complaints.    Vital Signs  Temp:  [97.9 °F (36.6 °C)-98.1 °F (36.7 °C)] 98.1 °F (36.7 °C)  Heart Rate:  [76-78] 76  Resp:  [18] 18  BP: (113-114)/(71-74) 114/71    Physical Exam:   General: Awake and alert   Abdomen: Fundus: firm, non tender    Extremities:  Calves NT bilaterally    Assessment/Plan     PPD2  S/P  -   Stable for discharge. Instructions reviewed      Discharge Disposition  Home or Self Carehome    Discharge Medications     Discharge Medications      New Medications      Instructions Start Date   ibuprofen 800 MG tablet  Commonly known as:  ADVIL,MOTRIN   800 mg, Oral, Every 8 Hours PRN      oxyCODONE-acetaminophen 5-325 MG per tablet  Commonly known as:  PERCOCET   1 tablet, Oral, Every 4 Hours PRN         Continue These Medications      Instructions Start Date   cetirizine 10 MG tablet  Commonly known as:  zyrTEC   10 mg, Oral, Daily      cholecalciferol 400 units tablet  Commonly known as:  VITAMIN D3   400 Units, Oral, Daily      prenatal (CLASSIC) vitamin 28-0.8 MG tablet tablet   1 tablet, Oral, Daily      sertraline 25 MG tablet  Commonly known as:  ZOLOFT   25 mg, Oral, Daily               The patient has been prescribed a controlled substance.  She has been counseled on the risks associated with using the medication.   The addictive potential of this medication and alternatives were discussed carefully with this patient and she demonstrated  understanding.  A EREN report has been obtained and reviewed.       Activity at Discharge:   Vag rest  Follow-up Appointments  6 weeks      Test Results Pending at Discharge     none  Ninfa Au MD  12/09/18  10:31 AM

## 2018-12-09 NOTE — PLAN OF CARE
Problem: Patient Care Overview  Goal: Plan of Care Review  Outcome: Ongoing (interventions implemented as appropriate)   12/09/18 0620   Coping/Psychosocial   Plan of Care Reviewed With patient   Plan of Care Review   Progress improving   OTHER   Outcome Summary Stable, pain well controlled with po pain meds, ambulates without any problems,     Goal: Individualization and Mutuality  Outcome: Ongoing (interventions implemented as appropriate)    Goal: Interprofessional Rounds/Family Conf  Outcome: Ongoing (interventions implemented as appropriate)      Problem: Postpartum (Vaginal Delivery) (Adult,Obstetrics,Pediatric)  Goal: Signs and Symptoms of Listed Potential Problems Will be Absent, Minimized or Managed (Postpartum)  Outcome: Ongoing (interventions implemented as appropriate)      Problem: Breastfeeding (Adult,Obstetrics,Pediatric)  Goal: Signs and Symptoms of Listed Potential Problems Will be Absent, Minimized or Managed (Breastfeeding)  Outcome: Ongoing (interventions implemented as appropriate)

## 2018-12-14 ENCOUNTER — HOSPITAL ENCOUNTER (OUTPATIENT)
Dept: LACTATION | Facility: HOSPITAL | Age: 33
Discharge: HOME | End: 2018-12-14

## 2018-12-14 NOTE — LACTATION NOTE
Lactation Consult Note  P1 term baby one week old today.  Aracelis's breast are firm and engorged. Baby transferred 46cc from both breasts using 24mm nipple shield then Aracelis pumped. Her breasts felt softer after pumping. Encouraged breastfeeding then pumping every 2-3 hrs today until feeling soft. Discussed s/s of Mastitis and to call OB. Baby was sleepy and nursed better on second breast. She is back to birth weight. She transferred 46mls and according to her pre feed weight of 7#4.5 she needs 2oz-2.5oz per feeding. After the feeding she took 15ml ebm. After feeding weight was 7#6.2. They will f/u next week.    Evaluation Completed: 2018 12:40 PM  Patient Name: Aracelis Mcrae  :  1985  MRN:  [id number]     REFERRAL  INFORMATION:                          Date of Referral: 18   Person Making Referral: patient  Maternal Reason for Referral: breastfeeding currently  Infant Reason for Referral: other (see comments)(difficulty latching)      MATERNAL ASSESSMENT:  Breast Size Issue: none (18 1200 : Coby Fowler RN)  Breast Shape: Bilateral:, round (18 1200 : Coby Fowler RN)  Breast Density: Bilateral:, engorged (18 1200 : Coby Fowler RN)  Areola: Bilateral:, firm (18 1200 : Coby Fowler RN)  Nipples: Bilateral:, everted (18 1200 : Coby Fowler RN)                MATERNAL INFANT FEEDING:  Maternal Preparation: hand hygiene (18 1200 : Coby Fowler RN)  Maternal Emotional State: assist needed (18 1200 : Coby Fowler RN)  Infant Positioning: cross-cradle (18 1200 : Coby Fowler RN)   Signs of Milk Transfer: audible swallow, breasts soften with feeding, infant jaw motion present, suck/swallow ratio (18 1200 : Coby Fowler RN)  Pain with Feeding: no (18 1200 : Coby Fowler RN)           Milk Ejection Reflex: present (18 1200 : Coby Fowler RN)            Latch Assistance: yes(24mm nipple shield) (18 1200 : Coby Fowler RN)                           Feeding Readiness Cues: quiet, rooting (18 1200 : Coby Fowler RN)  Satiety Cues: calm after feeding (18 1200 : Coby Fowler RN)     Effective Latch During Feeding: yes (18 1200 : Coby Fowler RN)  Suck/Swallow Coordination: present (18 1200 : Coby Fowler RN)        Prefeeding Weight (gm): 3304 g (116.5 oz) (18 1200 : Coby Fowler RN)  Postfeeding Weight (gm): 3350 g (118.2 oz) (18 1200 : Coby Fowler RN)  Weight Gain/Loss (gm) : 46 g (1.6 oz) (18 1200 : Coby Fowler RN)      Latch: 2-->grasps breast, tongue down, lips flanged, rhythmic sucking (18 1200 : Coby Fowler RN)  Audible Swallowin-->spontaneous and intermittent (24 hrs old) (18 1200 : Coby Fowler RN)  Type of Nipple: 2-->everted (after stimulation) (18 1200 : Coby Fowler RN)  Comfort (Breast/Nipple): 0-->engorged, cracked, bleeding, large blisters or bruises (18 1200 : Coby Fowler RN)  Hold (Positioning): 1-->minimal assist, teach one side, mother does other, staff holds (18 1200 : Coby Fowler RN)  Latch Score: 7 (18 1200 : Coby Fowler RN)      EQUIPMENT TYPE:  Breast Pump Type: double electric, personal (18 1200 : Coby Fowler RN)  Breast Pump Flange Type: hard (18 1200 : Coby Fowler RN)  Breast Pump Flange Size: 24 mm (18 1200 : Coby Fowler, RN)                        BREAST PUMPING:  Breast Pumping Interventions: post-feed pumping encouraged (18 1200 : Coby Fowler RN)  Breast Pumping: double electric breast pump utilized (18 1200 : Coby Fowler RN)    LACTATION REFERRALS:  Lactation Referrals: outpatient lactation program (18 1200 : Coby Fowler,  RN)

## 2020-02-10 LAB
EXTERNAL ANTIBODY SCREEN: NEGATIVE
EXTERNAL GTT 1 HOUR: 104
EXTERNAL HEPATITIS B SURFACE ANTIGEN: NEGATIVE
EXTERNAL HEPATITIS C AB: NORMAL
EXTERNAL RUBELLA QUALITATIVE: NORMAL
EXTERNAL SYPHILIS RPR SCREEN: NORMAL
HIV1 P24 AG SERPL QL IA: NORMAL

## 2020-09-21 ENCOUNTER — HOSPITAL ENCOUNTER (INPATIENT)
Dept: LABOR AND DELIVERY | Facility: HOSPITAL | Age: 35
Discharge: HOME | End: 2020-09-21

## 2020-09-21 ENCOUNTER — HOSPITAL ENCOUNTER (INPATIENT)
Facility: HOSPITAL | Age: 35
LOS: 1 days | Discharge: HOME | End: 2020-09-22
Attending: OBSTETRICS & GYNECOLOGY | Admitting: OBSTETRICS & GYNECOLOGY

## 2020-09-21 ENCOUNTER — ANESTHESIA (OUTPATIENT)
Dept: LABOR AND DELIVERY | Facility: HOSPITAL | Age: 35
End: 2020-09-21

## 2020-09-21 ENCOUNTER — ANESTHESIA EVENT (OUTPATIENT)
Dept: LABOR AND DELIVERY | Facility: HOSPITAL | Age: 35
End: 2020-09-21

## 2020-09-21 DIAGNOSIS — Z34.90 PREGNANCY, UNSPECIFIED GESTATIONAL AGE: Primary | ICD-10-CM

## 2020-09-21 LAB
ABO GROUP BLD: NORMAL
BLD GP AB SCN SERPL QL: NEGATIVE
DEPRECATED RDW RBC AUTO: 41.1 FL (ref 37–54)
ERYTHROCYTE [DISTWIDTH] IN BLOOD BY AUTOMATED COUNT: 12 % (ref 12.3–15.4)
EXTERNAL GROUP B STREP ANTIGEN: NORMAL
HCT VFR BLD AUTO: 34.6 % (ref 34–46.6)
HGB BLD-MCNC: 11.7 G/DL (ref 12–15.9)
MCH RBC QN AUTO: 31.5 PG (ref 26.6–33)
MCHC RBC AUTO-ENTMCNC: 33.8 G/DL (ref 31.5–35.7)
MCV RBC AUTO: 93 FL (ref 79–97)
PLATELET # BLD AUTO: 190 10*3/MM3 (ref 140–450)
PMV BLD AUTO: 10.9 FL (ref 6–12)
RBC # BLD AUTO: 3.72 10*6/MM3 (ref 3.77–5.28)
RH BLD: POSITIVE
SARS-COV-2 RDRP RESP QL NAA+PROBE: NOT DETECTED
T&S EXPIRATION DATE: NORMAL
WBC # BLD AUTO: 11.37 10*3/MM3 (ref 3.4–10.8)

## 2020-09-21 PROCEDURE — 86850 RBC ANTIBODY SCREEN: CPT | Performed by: OBSTETRICS & GYNECOLOGY

## 2020-09-21 PROCEDURE — 86901 BLOOD TYPING SEROLOGIC RH(D): CPT | Performed by: OBSTETRICS & GYNECOLOGY

## 2020-09-21 PROCEDURE — 85027 COMPLETE CBC AUTOMATED: CPT | Performed by: OBSTETRICS & GYNECOLOGY

## 2020-09-21 PROCEDURE — 0KQM0ZZ REPAIR PERINEUM MUSCLE, OPEN APPROACH: ICD-10-PCS | Performed by: OBSTETRICS & GYNECOLOGY

## 2020-09-21 PROCEDURE — 86900 BLOOD TYPING SEROLOGIC ABO: CPT | Performed by: OBSTETRICS & GYNECOLOGY

## 2020-09-21 PROCEDURE — 3E033VJ INTRODUCTION OF OTHER HORMONE INTO PERIPHERAL VEIN, PERCUTANEOUS APPROACH: ICD-10-PCS | Performed by: OBSTETRICS & GYNECOLOGY

## 2020-09-21 PROCEDURE — 10907ZC DRAINAGE OF AMNIOTIC FLUID, THERAPEUTIC FROM PRODUCTS OF CONCEPTION, VIA NATURAL OR ARTIFICIAL OPENING: ICD-10-PCS | Performed by: OBSTETRICS & GYNECOLOGY

## 2020-09-21 PROCEDURE — 87635 SARS-COV-2 COVID-19 AMP PRB: CPT | Performed by: OBSTETRICS & GYNECOLOGY

## 2020-09-21 PROCEDURE — C1755 CATHETER, INTRASPINAL: HCPCS | Performed by: ANESTHESIOLOGY

## 2020-09-21 PROCEDURE — C1755 CATHETER, INTRASPINAL: HCPCS

## 2020-09-21 RX ORDER — LANOLIN
CREAM (ML) TOPICAL
Status: DISCONTINUED | OUTPATIENT
Start: 2020-09-21 | End: 2020-09-22 | Stop reason: HOSPADM

## 2020-09-21 RX ORDER — MAGNESIUM CARB/ALUMINUM HYDROX 105-160MG
30 TABLET,CHEWABLE ORAL ONCE
Status: DISCONTINUED | OUTPATIENT
Start: 2020-09-21 | End: 2020-09-21 | Stop reason: HOSPADM

## 2020-09-21 RX ORDER — HYDROXYZINE 50 MG/1
50 TABLET, FILM COATED ORAL NIGHTLY PRN
Status: DISCONTINUED | OUTPATIENT
Start: 2020-09-21 | End: 2020-09-22 | Stop reason: HOSPADM

## 2020-09-21 RX ORDER — BISACODYL 10 MG
10 SUPPOSITORY, RECTAL RECTAL DAILY PRN
Status: DISCONTINUED | OUTPATIENT
Start: 2020-09-22 | End: 2020-09-22 | Stop reason: HOSPADM

## 2020-09-21 RX ORDER — HYDROCORTISONE 25 MG/G
1 CREAM TOPICAL AS NEEDED
Status: DISCONTINUED | OUTPATIENT
Start: 2020-09-21 | End: 2020-09-22 | Stop reason: HOSPADM

## 2020-09-21 RX ORDER — SODIUM CHLORIDE 0.9 % (FLUSH) 0.9 %
10 SYRINGE (ML) INJECTION AS NEEDED
Status: DISCONTINUED | OUTPATIENT
Start: 2020-09-21 | End: 2020-09-21 | Stop reason: HOSPADM

## 2020-09-21 RX ORDER — LIDOCAINE HYDROCHLORIDE AND EPINEPHRINE 15; 5 MG/ML; UG/ML
INJECTION, SOLUTION EPIDURAL AS NEEDED
Status: DISCONTINUED | OUTPATIENT
Start: 2020-09-21 | End: 2020-09-21 | Stop reason: SURG

## 2020-09-21 RX ORDER — OXYTOCIN-SODIUM CHLORIDE 0.9% IV SOLN 30 UNIT/500ML 30-0.9/5 UT/ML-%
999 SOLUTION INTRAVENOUS ONCE
Status: DISCONTINUED | OUTPATIENT
Start: 2020-09-21 | End: 2020-09-21 | Stop reason: HOSPADM

## 2020-09-21 RX ORDER — ONDANSETRON 4 MG/1
4 TABLET, FILM COATED ORAL EVERY 8 HOURS PRN
Status: DISCONTINUED | OUTPATIENT
Start: 2020-09-21 | End: 2020-09-22 | Stop reason: HOSPADM

## 2020-09-21 RX ORDER — OXYCODONE AND ACETAMINOPHEN 10; 325 MG/1; MG/1
1 TABLET ORAL EVERY 4 HOURS PRN
Status: DISCONTINUED | OUTPATIENT
Start: 2020-09-21 | End: 2020-09-22 | Stop reason: HOSPADM

## 2020-09-21 RX ORDER — DIPHENHYDRAMINE HYDROCHLORIDE 50 MG/ML
12.5 INJECTION INTRAMUSCULAR; INTRAVENOUS EVERY 8 HOURS PRN
Status: DISCONTINUED | OUTPATIENT
Start: 2020-09-21 | End: 2020-09-21 | Stop reason: HOSPADM

## 2020-09-21 RX ORDER — CARBOPROST TROMETHAMINE 250 UG/ML
250 INJECTION, SOLUTION INTRAMUSCULAR AS NEEDED
Status: DISCONTINUED | OUTPATIENT
Start: 2020-09-21 | End: 2020-09-21 | Stop reason: HOSPADM

## 2020-09-21 RX ORDER — ONDANSETRON 2 MG/ML
4 INJECTION INTRAMUSCULAR; INTRAVENOUS EVERY 6 HOURS PRN
Status: DISCONTINUED | OUTPATIENT
Start: 2020-09-21 | End: 2020-09-22 | Stop reason: HOSPADM

## 2020-09-21 RX ORDER — OXYTOCIN-SODIUM CHLORIDE 0.9% IV SOLN 30 UNIT/500ML 30-0.9/5 UT/ML-%
250 SOLUTION INTRAVENOUS CONTINUOUS
Status: ACTIVE | OUTPATIENT
Start: 2020-09-21 | End: 2020-09-21

## 2020-09-21 RX ORDER — ONDANSETRON 2 MG/ML
4 INJECTION INTRAMUSCULAR; INTRAVENOUS ONCE AS NEEDED
Status: DISCONTINUED | OUTPATIENT
Start: 2020-09-21 | End: 2020-09-21 | Stop reason: HOSPADM

## 2020-09-21 RX ORDER — FAMOTIDINE 10 MG/ML
20 INJECTION, SOLUTION INTRAVENOUS ONCE AS NEEDED
Status: DISCONTINUED | OUTPATIENT
Start: 2020-09-21 | End: 2020-09-21 | Stop reason: HOSPADM

## 2020-09-21 RX ORDER — CALCIUM CARBONATE 200(500)MG
2 TABLET,CHEWABLE ORAL 3 TIMES DAILY PRN
Status: DISCONTINUED | OUTPATIENT
Start: 2020-09-21 | End: 2020-09-22 | Stop reason: HOSPADM

## 2020-09-21 RX ORDER — PROMETHAZINE HYDROCHLORIDE 12.5 MG/1
12.5 SUPPOSITORY RECTAL EVERY 6 HOURS PRN
Status: DISCONTINUED | OUTPATIENT
Start: 2020-09-21 | End: 2020-09-22 | Stop reason: HOSPADM

## 2020-09-21 RX ORDER — DOCUSATE SODIUM 100 MG/1
100 CAPSULE, LIQUID FILLED ORAL 2 TIMES DAILY
Status: DISCONTINUED | OUTPATIENT
Start: 2020-09-21 | End: 2020-09-22 | Stop reason: HOSPADM

## 2020-09-21 RX ORDER — CALCIUM CARBONATE 200(500)MG
1 TABLET,CHEWABLE ORAL DAILY
COMMUNITY
End: 2020-09-22 | Stop reason: HOSPADM

## 2020-09-21 RX ORDER — EPHEDRINE SULFATE 50 MG/ML
5 INJECTION, SOLUTION INTRAVENOUS AS NEEDED
Status: DISCONTINUED | OUTPATIENT
Start: 2020-09-21 | End: 2020-09-21 | Stop reason: HOSPADM

## 2020-09-21 RX ORDER — ERYTHROMYCIN 5 MG/G
OINTMENT OPHTHALMIC
Status: DISPENSED
Start: 2020-09-21 | End: 2020-09-22

## 2020-09-21 RX ORDER — PROMETHAZINE HYDROCHLORIDE 25 MG/1
25 TABLET ORAL EVERY 6 HOURS PRN
Status: DISCONTINUED | OUTPATIENT
Start: 2020-09-21 | End: 2020-09-22 | Stop reason: HOSPADM

## 2020-09-21 RX ORDER — SODIUM CHLORIDE, SODIUM LACTATE, POTASSIUM CHLORIDE, CALCIUM CHLORIDE 600; 310; 30; 20 MG/100ML; MG/100ML; MG/100ML; MG/100ML
125 INJECTION, SOLUTION INTRAVENOUS CONTINUOUS
Status: DISCONTINUED | OUTPATIENT
Start: 2020-09-21 | End: 2020-09-21

## 2020-09-21 RX ORDER — PHYTONADIONE 1 MG/.5ML
INJECTION, EMULSION INTRAMUSCULAR; INTRAVENOUS; SUBCUTANEOUS
Status: DISPENSED
Start: 2020-09-21 | End: 2020-09-22

## 2020-09-21 RX ORDER — OXYCODONE HYDROCHLORIDE AND ACETAMINOPHEN 5; 325 MG/1; MG/1
1 TABLET ORAL EVERY 4 HOURS PRN
Status: DISCONTINUED | OUTPATIENT
Start: 2020-09-21 | End: 2020-09-22 | Stop reason: HOSPADM

## 2020-09-21 RX ORDER — MISOPROSTOL 200 UG/1
600 TABLET ORAL ONCE AS NEEDED
Status: DISCONTINUED | OUTPATIENT
Start: 2020-09-21 | End: 2020-09-22 | Stop reason: HOSPADM

## 2020-09-21 RX ORDER — IBUPROFEN 800 MG/1
800 TABLET ORAL EVERY 8 HOURS PRN
Status: DISCONTINUED | OUTPATIENT
Start: 2020-09-21 | End: 2020-09-22 | Stop reason: HOSPADM

## 2020-09-21 RX ORDER — OXYTOCIN-SODIUM CHLORIDE 0.9% IV SOLN 30 UNIT/500ML 30-0.9/5 UT/ML-%
2-30 SOLUTION INTRAVENOUS
Status: DISCONTINUED | OUTPATIENT
Start: 2020-09-21 | End: 2020-09-21 | Stop reason: HOSPADM

## 2020-09-21 RX ORDER — OXYTOCIN-SODIUM CHLORIDE 0.9% IV SOLN 30 UNIT/500ML 30-0.9/5 UT/ML-%
125 SOLUTION INTRAVENOUS CONTINUOUS PRN
Status: COMPLETED | OUTPATIENT
Start: 2020-09-21 | End: 2020-09-21

## 2020-09-21 RX ORDER — TERBUTALINE SULFATE 1 MG/ML
0.25 INJECTION, SOLUTION SUBCUTANEOUS AS NEEDED
Status: DISCONTINUED | OUTPATIENT
Start: 2020-09-21 | End: 2020-09-21 | Stop reason: HOSPADM

## 2020-09-21 RX ORDER — METHYLERGONOVINE MALEATE 0.2 MG/ML
200 INJECTION INTRAVENOUS ONCE AS NEEDED
Status: DISCONTINUED | OUTPATIENT
Start: 2020-09-21 | End: 2020-09-21 | Stop reason: HOSPADM

## 2020-09-21 RX ORDER — MISOPROSTOL 200 UG/1
800 TABLET ORAL AS NEEDED
Status: DISCONTINUED | OUTPATIENT
Start: 2020-09-21 | End: 2020-09-21 | Stop reason: HOSPADM

## 2020-09-21 RX ADMIN — DOCUSATE SODIUM 100 MG: 100 CAPSULE, LIQUID FILLED ORAL at 21:25

## 2020-09-21 RX ADMIN — SODIUM CHLORIDE, POTASSIUM CHLORIDE, SODIUM LACTATE AND CALCIUM CHLORIDE 125 ML/HR: 600; 310; 30; 20 INJECTION, SOLUTION INTRAVENOUS at 13:01

## 2020-09-21 RX ADMIN — Medication 10 ML/HR: at 11:40

## 2020-09-21 RX ADMIN — ANTACID TABLETS 1 TABLET: 500 TABLET, CHEWABLE ORAL at 21:22

## 2020-09-21 RX ADMIN — LIDOCAINE HYDROCHLORIDE AND EPINEPHRINE 3 ML: 15; 5 INJECTION, SOLUTION EPIDURAL at 11:36

## 2020-09-21 RX ADMIN — SODIUM CHLORIDE, POTASSIUM CHLORIDE, SODIUM LACTATE AND CALCIUM CHLORIDE 125 ML/HR: 600; 310; 30; 20 INJECTION, SOLUTION INTRAVENOUS at 09:30

## 2020-09-21 RX ADMIN — IBUPROFEN 800 MG: 800 TABLET ORAL at 21:29

## 2020-09-21 RX ADMIN — OXYTOCIN 125 ML/HR: 10 INJECTION, SOLUTION INTRAMUSCULAR; INTRAVENOUS at 15:29

## 2020-09-21 RX ADMIN — Medication: at 18:14

## 2020-09-21 NOTE — L&D DELIVERY NOTE
[facility]  Vaginal Delivery Note    Patient Name: Aracelis Mcrae  :  1985  MRN:  [id number]      Delivery     Delivery: Vaginal, Spontaneous     YOB: 2018    Time of Birth: 1:54 PM      Anesthesia: Epidural     Delivering clinician: Sonia Mattson MD       Infant    Findings: Viable male  infant    Infant observations: Weight: 3842 g (8 lb 7.5 oz)   Observations/Comments:  scale 1      Apgars: 8  @ 1 minute /    9  @ 5 minutes     Placenta, Cord, and Fluid    Placenta delivered  Spontaneous   at  2020  1:58 PM     Cord: 3 vessels  present.   Cord blood obtained: Yes    Cord gases obtained:  No      Repair    Episiotomy: No   Lacerations: Second degree   Estimated Blood Loss: 300  mls.          Delivery narrative: The patient is a 35 y.o.  at 40w6d.  Presented  For pitocin induction. She received an epidural which worked well throughout. arom was performed with clear fluid. Progressed normally to C/C/+2. . Fetal status reassuring throughout.  of viable male infant  @ 1:54 PM  over an intact perineum. ASDE. 3842 g (8 lb 7.5 oz) .  Placenta delivered spontaneously, intact with 3 vessel cord. Cervix and rectum intact. second degree laceration repaired in usual fashion with 3-0vicryl suture. Mother and baby recovering good condition.       Sonia Mattson MD  20  17:25 EDT

## 2020-09-21 NOTE — H&P
[facility]  Obstetric History and Physical    Patient Name: Aracelis Mcrae  :  1985  MRN:  [id number]      Chief Complaint   Patient presents with   • Scheduled Induction     40 6/7wk IOL       Subjective     Patient is a 35 y.o. female  currently at 40w6d, who presents for induction of labor secondary to favorable cervix at term. pnc is uncomplicated. .       Objective       Vital Signs Range for the last 24 hours  Temperature: Temp:  [98.1 °F (36.7 °C)-98.3 °F (36.8 °C)] 98.1 °F (36.7 °C)   Temp Source: Temp src: Oral   BP: BP: (110-129)/(65-83) 115/83   Pulse: Heart Rate:  [68-90] 82   Respirations: Resp:  [17] 17                   Physical Examination:     General :  Alert in NAD  Abdomen: Gravid, EFW 7pds by leopolds    Presentation: ceph   Cervix: Exam by: Method: sterile exam per RN   Dilation: Cervical Dilation (cm): 10   Effacement: Cervical Effacement: 100%   Station: Fetal Station: +1       Fetal Heart Rate Assessment   Method: Fetal HR Assessment Method: external   Beats/min: Fetal HR (beats/min): 130   Baseline: Fetal Heart Baseline Rate: normal range   Varibility: Fetal HR Variability: moderate (amplitude range 6 to 25 bpm)   Accels: Fetal HR Accelerations: greater than/equal to 15 bpm, lasting at least 15 seconds   Decels: Fetal HR Decelerations: absent   Tracing Category:       Uterine Assessment   Method: Method: palpation, external tocotransducer, per patient report   Frequency (min): Contraction Frequency (Minutes): 2-3   Ctx Count in 10 min:     Duration:     Intensity: Contraction Intensity: mild by palpation   Intensity by IUPC:     Resting Tone: Uterine Resting Tone: soft by palpation   Resting Tone by IUPC:     Cameron Units:           Results from last 7 days   Lab Units 20  0941   WBC 10*3/mm3 11.37*   HEMOGLOBIN g/dL 11.7*   HEMATOCRIT % 34.6   PLATELETS 10*3/mm3 190       Assessment/Plan     1.  Intrauterine pregnancy at 40w6d weeks gestation for induction with    reactive, reassuring fetal status.   Continue pitocin. epdiural prn. Anticipate .  Plan of care has been reviewed with patient and family.  All questions answered.      Pregnancy        H&P updated. The patient was examined and the following changes are noted above.         Sonia Mattson MD  2020  13:48 EDT

## 2020-09-21 NOTE — LACTATION NOTE
Mom wanting assistance with latching baby. Baby sleepy. Tried to wake baby to latch but he is too sleepy at this time. Educated and encouraged mom to hand express drops of colostrum to baby. Baby did lick off drops of colostrum from mom 's nipple. Encouraged mom to try again in 1/2 hour and call LC if needing assistance. Mom has personal pump  Lactation Consult Note    Evaluation Completed: 2020 17:44 EDT  Patient Name: Aracelis Mcrae  :  1985  MRN:  [id number]     REFERRAL  INFORMATION:                                         DELIVERY HISTORY:        Skin to skin initiation date/time: 2020  1:56 PM   Skin to skin end date/time:           MATERNAL ASSESSMENT:                               INFANT ASSESSMENT:  Information for the patient's :  Aracelis McraeHeather [[id number]]   No past medical history on file.                                                                                                     MATERNAL INFANT FEEDING:                                                                       EQUIPMENT TYPE:                                 BREAST PUMPING:          LACTATION REFERRALS:

## 2020-09-21 NOTE — ANESTHESIA PROCEDURE NOTES
Labor Epidural      Patient location during procedure: OB  Performed By  Anesthesiologist: Felix Mathew MD  Preanesthetic Checklist  Completed: patient identified, site marked, surgical consent, pre-op evaluation, timeout performed, IV checked, risks and benefits discussed and monitors and equipment checked  Prep:  Pt Position:sitting  Sterile Tech:cap, gloves and mask  Prep:chlorhexidine gluconate and isopropyl alcohol  Monitoring:blood pressure monitoring, continuous pulse oximetry and EKG  Epidural Block Procedure:  Approach:midline  Guidance:landmark technique and palpation technique  Location:L4-L5  Needle Type:Tuohy  Needle Gauge:18 G  Loss of Resistance Medium: saline  Loss of Resistance: 7cm  Cath Depth at skin:15 cm  Paresthesia: none  Aspiration:negative  Test Dose:negative  Post Assessment:  Dressing:occlusive dressing applied and secured with tape  Pt Tolerance:patient tolerated the procedure well with no apparent complications  Complications:no

## 2020-09-21 NOTE — PLAN OF CARE
Problem: Adult Inpatient Plan of Care  Goal: Plan of Care Review  9/21/2020 1424 by Isabela Mendoza RN  Outcome: Ongoing, Progressing  9/21/2020 1421 by Isabela Mendoza RN  Outcome: Ongoing, Progressing  Flowsheets (Taken 9/21/2020 1421)  Progress: improving  Plan of Care Reviewed With:   patient   spouse  Outcome Summary: pt and baby in rr stable  Goal: Patient-Specific Goal (Individualized)  9/21/2020 1424 by Isabela Mendoza RN  Outcome: Ongoing, Progressing  9/21/2020 1421 by Isabela Mendoza RN  Outcome: Ongoing, Progressing  Flowsheets (Taken 9/21/2020 0911)  Anxieties, Fears or Concerns: iol process  Goal: Absence of Hospital-Acquired Illness or Injury  9/21/2020 1424 by Isabela Mendoza RN  Outcome: Ongoing, Progressing  9/21/2020 1421 by Isabela Mendoza RN  Outcome: Ongoing, Progressing  Intervention: Identify and Manage Fall Risk  Flowsheets (Taken 9/21/2020 1421)  Safety Promotion/Fall Prevention: activity supervised  Intervention: Prevent Infection  Flowsheets (Taken 9/21/2020 1421)  Infection Prevention: hand hygiene promoted  Goal: Optimal Comfort and Wellbeing  9/21/2020 1424 by Isabela Mendoza RN  Outcome: Ongoing, Progressing  9/21/2020 1421 by Isabela Mendoza RN  Outcome: Ongoing, Progressing  Intervention: Provide Person-Centered Care  Flowsheets (Taken 9/21/2020 1421)  Trust Relationship/Rapport:   care explained   choices provided   questions answered  Goal: Readiness for Transition of Care  9/21/2020 1424 by Isabela Mendoza RN  Outcome: Ongoing, Progressing  9/21/2020 1421 by Isabela Mendoza RN  Outcome: Ongoing, Progressing  Intervention: Mutually Develop Transition Plan  Flowsheets  Taken 9/21/2020 1421  Equipment Needed After Discharge: none  Anticipated Changes Related to Illness: none  Concerns to be Addressed: no discharge needs identified  Readmission Within the Last 30 Days: no previous admission in last 30 days  Taken 9/21/2020 0911  Transportation Concerns: car, none  Taken 9/21/2020  0909  Equipment Currently Used at Home: none  Taken 2020 0907  Transportation Anticipated: family or friend will provide  Patient/Family Anticipated Services at Transition: none  Patient/Family Anticipates Transition to: home with family     Problem:  Fall Injury Risk  Goal: Absence of Fall, Infant Drop and Related Injury  2020 1424 by Isabela Mendoza RN  Outcome: Ongoing, Progressing  2020 142 by Isabela Mendoza RN  Outcome: Ongoing, Progressing  Intervention: Promote Injury-Free Environment  Recent Flowsheet Documentation  Taken 2020 142 by Isabela Mendoza RN  Safety Promotion/Fall Prevention: activity supervised     Problem: Device-Related Complication Risk (Anesthesia/Analgesia, Neuraxial)  Goal: Safe Infusion Delivery Completion  2020 142 by Isabela Mendoza RN  Outcome: Ongoing, Progressing  2020 142 by Isabela Mendoza RN  Outcome: Ongoing, Progressing     Problem: Infection (Anesthesia/Analgesia, Neuraxial)  Goal: Absence of Infection Signs and Symptoms  2020 142 by Isabela Mendoza RN  Outcome: Ongoing, Progressing  2020 142 by Isabela Mendoza RN  Outcome: Ongoing, Progressing  Intervention: Prevent or Manage Infection  Recent Flowsheet Documentation  Taken 2020 142 by Isabela Mendoza RN  Infection Prevention: hand hygiene promoted     Problem: Nausea and Vomiting (Anesthesia/Analgesia, Neuraxial)  Goal: Nausea and Vomiting Relief  2020 1424 by Isabela Mendoza RN  Outcome: Ongoing, Progressing  2020 142 by Isabela Mendoza RN  Outcome: Ongoing, Progressing     Problem: Pain (Anesthesia/Analgesia, Neuraxial)  Goal: Effective Pain Control  2020 1424 by Isabela Mendoza RN  Outcome: Ongoing, Progressing  2020 142 by Isabela Mendoza RN  Outcome: Ongoing, Progressing     Problem: Respiratory Compromise (Anesthesia/Analgesia, Neuraxial)  Goal: Effective Oxygenation and Ventilation  2020 142 by Isabela Mendoza RN  Outcome: Ongoing,  Progressing  9/21/2020 1421 by Isabela Mendoza RN  Outcome: Ongoing, Progressing     Problem: Sensorimotor Impairment (Anesthesia/Analgesia, Neuraxial)  Goal: Baseline Motor Function  9/21/2020 1424 by Isabela Mendoza RN  Outcome: Ongoing, Progressing  9/21/2020 1421 by Isabela Mendoza RN  Outcome: Ongoing, Progressing  Intervention: Optimize Sensorimotor Function  Recent Flowsheet Documentation  Taken 9/21/2020 1421 by Isabela Mendoza RN  Safety Promotion/Fall Prevention: activity supervised     Problem: Urinary Retention (Anesthesia/Analgesia, Neuraxial)  Goal: Effective Urinary Elimination  9/21/2020 1424 by Isabela Mendoza RN  Outcome: Ongoing, Progressing  9/21/2020 1421 by Isabela Mendoza RN  Outcome: Ongoing, Progressing   Goal Outcome Evaluation:  Plan of Care Reviewed With: patient, spouse  Progress: improving  Outcome Summary: pt and baby in rr stable

## 2020-09-21 NOTE — ANESTHESIA POSTPROCEDURE EVALUATION
"Patient: Aracelis Mcrae    Procedure Summary     Date: 09/21/20 Room / Location:     Anesthesia Start: 1125 Anesthesia Stop: 1354    Procedure: LABOR ANALGESIA Diagnosis:     Scheduled Providers:  Provider: Felix Mathew MD    Anesthesia Type: epidural ASA Status: 2          Anesthesia Type: epidural    Vitals  Vitals Value Taken Time   /62 09/21/20 1430   Temp 36.7 °C (98 °F) 09/21/20 1415   Pulse 80 09/21/20 1430   Resp 17 09/21/20 1430   SpO2             Post Anesthesia Care and Evaluation    Patient location during evaluation: bedside  Patient participation: complete - patient participated  Level of consciousness: awake and alert  Pain management: adequate  Airway patency: patent  Anesthetic complications: No anesthetic complications    Cardiovascular status: acceptable  Respiratory status: acceptable  Hydration status: acceptable    Comments: /62   Pulse 80   Temp 36.7 °C (98 °F)   Resp 17   Ht 167.6 cm (66\")   Wt 76.4 kg (168 lb 6.4 oz)   LMP 12/10/2019   SpO2 98%   Breastfeeding Yes   BMI 27.18 kg/m²       "

## 2020-09-22 VITALS
SYSTOLIC BLOOD PRESSURE: 104 MMHG | HEART RATE: 76 BPM | OXYGEN SATURATION: 98 % | DIASTOLIC BLOOD PRESSURE: 66 MMHG | BODY MASS INDEX: 27.06 KG/M2 | WEIGHT: 168.4 LBS | RESPIRATION RATE: 18 BRPM | HEIGHT: 66 IN | TEMPERATURE: 98.1 F

## 2020-09-22 LAB
BASOPHILS # BLD AUTO: 0.04 10*3/MM3 (ref 0–0.2)
BASOPHILS NFR BLD AUTO: 0.3 % (ref 0–1.5)
DEPRECATED RDW RBC AUTO: 42.1 FL (ref 37–54)
EOSINOPHIL # BLD AUTO: 0.1 10*3/MM3 (ref 0–0.4)
EOSINOPHIL NFR BLD AUTO: 0.8 % (ref 0.3–6.2)
ERYTHROCYTE [DISTWIDTH] IN BLOOD BY AUTOMATED COUNT: 12.2 % (ref 12.3–15.4)
HCT VFR BLD AUTO: 29.1 % (ref 34–46.6)
HGB BLD-MCNC: 9.6 G/DL (ref 12–15.9)
IMM GRANULOCYTES # BLD AUTO: 0.1 10*3/MM3 (ref 0–0.05)
IMM GRANULOCYTES NFR BLD AUTO: 0.8 % (ref 0–0.5)
LYMPHOCYTES # BLD AUTO: 2.11 10*3/MM3 (ref 0.7–3.1)
LYMPHOCYTES NFR BLD AUTO: 17.3 % (ref 19.6–45.3)
MCH RBC QN AUTO: 31.6 PG (ref 26.6–33)
MCHC RBC AUTO-ENTMCNC: 33 G/DL (ref 31.5–35.7)
MCV RBC AUTO: 95.7 FL (ref 79–97)
MONOCYTES # BLD AUTO: 0.72 10*3/MM3 (ref 0.1–0.9)
MONOCYTES NFR BLD AUTO: 5.9 % (ref 5–12)
NEUTROPHILS NFR BLD AUTO: 74.9 % (ref 42.7–76)
NEUTROPHILS NFR BLD AUTO: 9.11 10*3/MM3 (ref 1.7–7)
NRBC BLD AUTO-RTO: 0 /100 WBC (ref 0–0.2)
PLATELET # BLD AUTO: 147 10*3/MM3 (ref 140–450)
PMV BLD AUTO: 10.8 FL (ref 6–12)
RBC # BLD AUTO: 3.04 10*6/MM3 (ref 3.77–5.28)
WBC # BLD AUTO: 12.18 10*3/MM3 (ref 3.4–10.8)

## 2020-09-22 PROCEDURE — 85025 COMPLETE CBC W/AUTO DIFF WBC: CPT | Performed by: OBSTETRICS & GYNECOLOGY

## 2020-09-22 RX ORDER — OXYCODONE HYDROCHLORIDE AND ACETAMINOPHEN 5; 325 MG/1; MG/1
1 TABLET ORAL EVERY 4 HOURS PRN
Qty: 10 TABLET | Refills: 0 | Status: SHIPPED | OUTPATIENT
Start: 2020-09-22 | End: 2020-10-01

## 2020-09-22 RX ADMIN — DOCUSATE SODIUM 100 MG: 100 CAPSULE, LIQUID FILLED ORAL at 11:56

## 2020-09-22 RX ADMIN — IBUPROFEN 800 MG: 800 TABLET ORAL at 11:56

## 2020-09-22 RX ADMIN — IBUPROFEN 800 MG: 800 TABLET ORAL at 03:27

## 2020-09-22 NOTE — DISCHARGE SUMMARY
Date of Discharge:  2020      Condition on Discharge:   Subjective   Postpartum Day 2 Vaginal Delivery.    The patient feels well without complaints.    Vital Signs  Temp:  [98 °F (36.7 °C)-98.9 °F (37.2 °C)] 98.1 °F (36.7 °C)  Heart Rate:  [60-90] 76  Resp:  [16-18] 18  BP: ()/(51-89) 104/66    Physical Exam:   General: Awake and alert   Abdomen: Fundus: firm, non tender    Extremities:  Calves NT bilaterally    Assessment/Plan     PPD2  S/P  -   Stable for discharge. Instructions reviewed      Discharge Disposition  Home or Self Care    Discharge Medications     Discharge Medications      New Medications      Instructions Start Date   oxyCODONE-acetaminophen 5-325 MG per tablet  Commonly known as: PERCOCET   1 tablet, Oral, Every 4 Hours PRN         Continue These Medications      Instructions Start Date   cholecalciferol 10 MCG (400 UNIT) tablet  Commonly known as: VITAMIN D3   400 Units, Oral, Daily      fluticasone 50 MCG/ACT nasal spray  Commonly known as: FLONASE   2 sprays, Nasal, Daily      ibuprofen 800 MG tablet  Commonly known as: ADVIL,MOTRIN   800 mg, Oral, Every 8 Hours PRN      prenatal (CLASSIC) vitamin  tablet  Generic drug: prenatal vitamin   1 tablet, Oral, Daily      sertraline 50 MG tablet  Commonly known as: ZOLOFT   50 mg, Oral, Daily         Stop These Medications    calcium carbonate 500 MG chewable tablet  Commonly known as: TUMS              The patient has been prescribed a controlled substance.  She has been counseled on the risks associated with using the medication.   The addictive potential of this medication and alternatives were discussed carefully with this patient and she demonstrated understanding.  A EREN report has been obtained and reviewed.       Activity at Discharge: restrictions reviewed    Follow-up Appointments  6 wks      Test Results Pending at Discharge       Isatu Madison MD  20  10:10 EDT

## 2020-09-22 NOTE — LACTATION NOTE
This note was copied from a baby's chart.  Assisted with deep latch in football, baby nursed with nutritive suckle for 5 minutes then went to sleep. Mom reports her milk came in on day 5 and she was engorged with her first baby. Suggested waking baby now to nurse more but Mom is too tired now. Discussed ways to know baby is getting enough breast milk, STS, hand expression and encouraged longer feeds. Also discussed pumping every 3 hrs if baby is too sleepy to nurse to prevent engorgement this time. Encouraged to call for further assist.

## 2020-09-22 NOTE — LACTATION NOTE
Mother called for latch assist. Demonstrated deep latch technique, positioning in cross cradle and football, and hand expression. Mother anxious r/t first baby had significant trouble in the first month latching. Infant thrusts tongue and has uncoordinated suck. Discussed suck training with parents. Mother able to latch infant to both breasts with assistance for about 5 min each, which is the longest feeding so far. Encouraged mother to keep attempting every 2-3 hours and to continue hand expressing if infant too sleepy to latch. Discussed normal infant sleepiness and colostrum expectations. Advised mother to call for any needs     Lactation Consult Note    Evaluation Completed: 2020 22:28 EDT  Patient Name: Aracelis Mcrae  :  1985  MRN:  [id number]     REFERRAL  INFORMATION:                          Date of Referral: 20   Person Making Referral: nurse  Maternal Reason for Referral: breastfeeding currently       DELIVERY HISTORY:        Skin to skin initiation date/time: 2020  1:56 PM   Skin to skin end date/time:           MATERNAL ASSESSMENT:     Breast Shape: Bilateral:, round (20 : Melany Wing, RN)  Breast Density: Bilateral:, soft (20 : Melany Wing, RN)  Areola: Bilateral:, elastic (20 : Melany Wing, RN)  Nipples: Bilateral:, everted (20 : Melany Wing, RN)                INFANT ASSESSMENT:  Information for the patient's :  Ross Mcrae [[id number]]   No past medical history on file.                                               Breastfeeding Time, Left (min): 5 (20 : Melany Wing, RN)   Breastfeeding Time, Right (min): 5 (20 : Melany Wing, RN)                Latch: 1-->repeated attempts, holds nipple in mouth, stimulate to suck (20 : Melany Wing, RN)   Audible Swallowin-->a few with stimulation (20 : Melany Wing, RN)   Type of Nipple:  2-->everted (after stimulation) (09/21/20 2030 : Melany Wing, RN)   Comfort (Breast/Nipple): 2-->soft/nontender (09/21/20 2030 : Melany Wing, RN)   Hold (Positioning): 1-->minimal assist, teach one side, mother does other, staff holds (09/21/20 2030 : Melany Wing, RN)   Latch Score: 7 (09/21/20 2030 : Melany Wing, RN)                    MATERNAL INFANT FEEDING:     Maternal Emotional State: anxious (09/21/20 2030 : Melany Wing, RN)  Infant Positioning: clutch/football, cross-cradle (09/21/20 2030 : Melany Wing, RN)   Signs of Milk Transfer: deep jaw excursions noted, suck/swallow ratio (09/21/20 2030 : Melany Wing, RN)  Pain with Feeding: no (09/21/20 2030 : Melany Wing, RN)        Comfort Measures Before/During Feeding: latch adjusted, infant position adjusted, maternal position adjusted, suction broken using finger (09/21/20 2030 : Melany Wing, RN)  Milk Ejection Reflex: present (09/21/20 2030 : Melany Wing, RN)           Latch Assistance: full assistance needed (09/21/20 2030 : Melany Wing, RN)                               EQUIPMENT TYPE:                                 BREAST PUMPING:          LACTATION REFERRALS:

## 2020-09-22 NOTE — LACTATION NOTE
This note was copied from a baby's chart.  Mom was able to nurse baby about 15 minutes and he had another wet and green stool diaper but Mom is worried he is not getting enough milk. She has her pump here and will start pumping. Gave syringes and offered SNS but Mom declines. She also declines assist with feeding baby ebm as she has done this with her last baby.

## 2021-02-05 ENCOUNTER — OFFICE VISIT (OUTPATIENT)
Dept: INTERNAL MEDICINE | Facility: CLINIC | Age: 36
End: 2021-02-05

## 2021-02-05 VITALS
TEMPERATURE: 96.8 F | HEART RATE: 83 BPM | SYSTOLIC BLOOD PRESSURE: 120 MMHG | BODY MASS INDEX: 21.38 KG/M2 | OXYGEN SATURATION: 98 % | WEIGHT: 133 LBS | HEIGHT: 66 IN | DIASTOLIC BLOOD PRESSURE: 76 MMHG | RESPIRATION RATE: 16 BRPM

## 2021-02-05 DIAGNOSIS — F41.9 ANXIETY: ICD-10-CM

## 2021-02-05 DIAGNOSIS — R53.83 OTHER FATIGUE: ICD-10-CM

## 2021-02-05 DIAGNOSIS — Z00.00 ROUTINE GENERAL MEDICAL EXAMINATION AT A HEALTH CARE FACILITY: Primary | ICD-10-CM

## 2021-02-05 PROBLEM — Z34.90 PREGNANCY: Status: RESOLVED | Noted: 2020-09-21 | Resolved: 2021-02-05

## 2021-02-05 LAB
BILIRUB BLD-MCNC: NEGATIVE MG/DL
CLARITY, POC: CLEAR
COLOR UR: YELLOW
GLUCOSE UR STRIP-MCNC: NEGATIVE MG/DL
KETONES UR QL: ABNORMAL
LEUKOCYTE EST, POC: NEGATIVE
NITRITE UR-MCNC: NEGATIVE MG/ML
PH UR: 6 [PH] (ref 5–8)
PROT UR STRIP-MCNC: ABNORMAL MG/DL
RBC # UR STRIP: NEGATIVE /UL
SP GR UR: 1.03 (ref 1–1.03)
UROBILINOGEN UR QL: NORMAL

## 2021-02-05 PROCEDURE — 81003 URINALYSIS AUTO W/O SCOPE: CPT | Performed by: INTERNAL MEDICINE

## 2021-02-05 PROCEDURE — 99213 OFFICE O/P EST LOW 20 MIN: CPT | Performed by: INTERNAL MEDICINE

## 2021-02-05 PROCEDURE — 99385 PREV VISIT NEW AGE 18-39: CPT | Performed by: INTERNAL MEDICINE

## 2021-02-05 RX ORDER — CLINDAMYCIN AND BENZOYL PEROXIDE 10; 50 MG/G; MG/G
GEL TOPICAL
COMMUNITY
Start: 2020-12-29 | End: 2023-02-15

## 2021-02-05 NOTE — PROGRESS NOTES
Chief Complaint   Patient presents with   • Annual Exam       Subjective   History of Present Illness    Aracelis Mcrae is a 35 y.o. female here for annual wellness. Pt does have acute issues to discuss today. Taking all meds as prescribed without any issues.   Pt notes her anxiety is ramping up, started getting worse about 1 month ago. Had been on sertraline prior to pregnancy and had done ok managing in post-partum state until recently. Has now been back on medications for a couple weeks. Panic attack x 1, none in past 10 days.    The following portions of the patient's history were reviewed and updated as appropriate: allergies, current medications, past family history, past medical history, past social history, past surgical history, and problem list.    Patient Care Team:  Carla Dove MD as PCP - General (Internal Medicine & Pediatrics)  Sonia Mattson MD as Consulting Physician (Obstetrics and Gynecology)  Mira Gregory MD (Dermatology)    Review of Systems   Constitutional: Negative for activity change, chills and fever.   HENT: Negative for congestion, ear pain, rhinorrhea and sore throat.    Eyes: Negative for double vision, pain and visual disturbance.   Respiratory: Negative for cough, shortness of breath and wheezing.    Cardiovascular: Negative for chest pain, palpitations and leg swelling.   Gastrointestinal: Negative for abdominal pain, blood in stool, constipation, diarrhea, nausea and vomiting.   Endocrine: Negative for cold intolerance and heat intolerance.   Genitourinary: Negative for difficulty urinating, dysuria and frequency.   Musculoskeletal: Negative for arthralgias and myalgias.   Skin: Negative for rash and skin lesions.         + hair loss   Neurological: Negative for dizziness, tremors and headache.   Hematological: Negative for adenopathy. Does not bruise/bleed easily.   Psychiatric/Behavioral: Positive for sleep disturbance. Negative for behavioral problems, suicidal  "ideas and depressed mood. The patient is nervous/anxious.        Body mass index is 21.47 kg/m².   Vitals:    02/05/21 0934   BP: 120/76   Pulse: 83   Resp: 16   Temp: 96.8 °F (36 °C)   SpO2: 98%   Weight: 60.3 kg (133 lb)   Height: 167.6 cm (66\")        Objective   Physical Exam  Vitals signs and nursing note reviewed.   Constitutional:       General: She is not in acute distress.     Appearance: Normal appearance.   HENT:      Head: Normocephalic and atraumatic.      Right Ear: Tympanic membrane and ear canal normal.      Left Ear: Tympanic membrane and ear canal normal.      Nose: Nose normal.      Mouth/Throat:      Mouth: Mucous membranes are moist.      Pharynx: Oropharynx is clear.   Eyes:      Extraocular Movements: Extraocular movements intact.      Conjunctiva/sclera: Conjunctivae normal.      Pupils: Pupils are equal, round, and reactive to light.   Neck:      Musculoskeletal: Normal range of motion and neck supple.   Cardiovascular:      Rate and Rhythm: Normal rate and regular rhythm.      Heart sounds: Normal heart sounds. No murmur.   Pulmonary:      Effort: Pulmonary effort is normal. No respiratory distress.      Breath sounds: Normal breath sounds. No wheezing or rhonchi.   Abdominal:      General: Bowel sounds are normal. There is no distension.      Palpations: Abdomen is soft. There is no mass.      Tenderness: There is no abdominal tenderness.      Comments: no hepatosplenomegaly   Musculoskeletal: Normal range of motion.         General: No deformity.   Skin:     General: Skin is warm and dry.      Capillary Refill: Capillary refill takes less than 2 seconds.      Findings: No lesion or rash.   Neurological:      General: No focal deficit present.      Mental Status: She is alert and oriented to person, place, and time.      Cranial Nerves: No cranial nerve deficit.   Psychiatric:         Mood and Affect: Mood normal.         Behavior: Behavior normal.         Judgment: Judgment normal. "         Current Outpatient Medications:   •  clindamycin-benzoyl peroxide (BENZACLIN) 1-5 % gel, APPLY TO FACE EVERY DAY AFTER WASHING, Disp: , Rfl:   •  Prenatal Vit-Fe Fumarate-FA (PRENATAL, CLASSIC, VITAMIN) 28-0.8 MG tablet tablet, Take 1 tablet by mouth Daily., Disp: , Rfl:   •  sertraline (ZOLOFT) 50 MG tablet, Take 50 mg by mouth Daily., Disp: , Rfl: 1     No results found for: CBCDIF, CMP, LIPIDINTERP, HGBA1C, TSH, [id number], PSA, TESTOSTERONE     Health Maintenance   Topic Date Due   • PAP SMEAR  02/04/2021   • TDAP/TD VACCINES (3 - Td) 06/24/2030   • INFLUENZA VACCINE  Completed        Immunization History   Administered Date(s) Administered   • Influenza, Unspecified 09/15/2020   • Tdap 09/10/2018, 06/24/2020       Assessment/Plan     Annual Wellness  - Labs ordered today including CBC, CMP, Fasting lipid panel, HgbA1C, TSH, Vit D and FE/TIBC/Ferritin. Will call with results once available and make follow up plan and med changes as appropriate.   - Cont current medications for chronic medical issues, refills sent as needed today.   - EKG not indicated  - PAP smear up to date and managed by gynecology. Last 1/2019 and negative. Pt with appt in 3/21 for repeat  - Mammo not indicated  - Cscope not yet indicated, due at 46 yo  - DEXA not yet indicated, due at 66 yo  - Lung CA screening not indicated  - Immunizations: Flu and TDaP UTD. Hep A deferred for now.    - Depression screen reviewed with patient and positive. Pt has recently restarted sertraline at home, currently on 50 mg and tolerating well but not yet to desired benefits. Pt to stay on meds another 2 weeks and then can increase to 75 mg daily as needed for improved effect. Also given recommendations for counselors.   - Advised behavioral modifications including dietary changes and increased exercise with goal of healthy weight and lifestyle.     Return in about 4 weeks (around 3/5/2021) for follow up anxiety.     Yolis Dove MD  [facility]  [facility]  Phone: [phone]  Fax: [phone]

## 2021-02-06 LAB
25(OH)D3+25(OH)D2 SERPL-MCNC: 33.5 NG/ML (ref 30–100)
ALBUMIN SERPL-MCNC: 4.8 G/DL (ref 3.5–5.2)
ALBUMIN/GLOB SERPL: 1.8 G/DL
ALP SERPL-CCNC: 88 U/L (ref 39–117)
ALT SERPL-CCNC: 14 U/L (ref 1–33)
AST SERPL-CCNC: 17 U/L (ref 1–32)
BASOPHILS # BLD AUTO: 0.06 10*3/MM3 (ref 0–0.2)
BASOPHILS NFR BLD AUTO: 1 % (ref 0–1.5)
BILIRUB SERPL-MCNC: 0.5 MG/DL (ref 0–1.2)
BUN SERPL-MCNC: 17 MG/DL (ref 6–20)
BUN/CREAT SERPL: 27 (ref 7–25)
CALCIUM SERPL-MCNC: 9.7 MG/DL (ref 8.6–10.5)
CHLORIDE SERPL-SCNC: 103 MMOL/L (ref 98–107)
CHOLEST SERPL-MCNC: 160 MG/DL (ref 0–200)
CO2 SERPL-SCNC: 26.5 MMOL/L (ref 22–29)
CREAT SERPL-MCNC: 0.63 MG/DL (ref 0.57–1)
EOSINOPHIL # BLD AUTO: 0.09 10*3/MM3 (ref 0–0.4)
EOSINOPHIL NFR BLD AUTO: 1.6 % (ref 0.3–6.2)
ERYTHROCYTE [DISTWIDTH] IN BLOOD BY AUTOMATED COUNT: 12.6 % (ref 12.3–15.4)
FERRITIN SERPL-MCNC: 39.6 NG/ML (ref 13–150)
GLOBULIN SER CALC-MCNC: 2.6 GM/DL
GLUCOSE SERPL-MCNC: 65 MG/DL (ref 65–99)
HBA1C MFR BLD: 5.4 % (ref 4.8–5.6)
HCT VFR BLD AUTO: 40.8 % (ref 34–46.6)
HDLC SERPL-MCNC: 81 MG/DL (ref 40–60)
HGB BLD-MCNC: 13.5 G/DL (ref 12–15.9)
IMM GRANULOCYTES # BLD AUTO: 0.01 10*3/MM3 (ref 0–0.05)
IMM GRANULOCYTES NFR BLD AUTO: 0.2 % (ref 0–0.5)
IRON SATN MFR SERPL: 29 % (ref 20–50)
IRON SERPL-MCNC: 100 MCG/DL (ref 37–145)
LDLC SERPL CALC-MCNC: 69 MG/DL (ref 0–100)
LYMPHOCYTES # BLD AUTO: 1.36 10*3/MM3 (ref 0.7–3.1)
LYMPHOCYTES NFR BLD AUTO: 23.7 % (ref 19.6–45.3)
MCH RBC QN AUTO: 29.7 PG (ref 26.6–33)
MCHC RBC AUTO-ENTMCNC: 33.1 G/DL (ref 31.5–35.7)
MCV RBC AUTO: 89.7 FL (ref 79–97)
MONOCYTES # BLD AUTO: 0.41 10*3/MM3 (ref 0.1–0.9)
MONOCYTES NFR BLD AUTO: 7.1 % (ref 5–12)
NEUTROPHILS # BLD AUTO: 3.82 10*3/MM3 (ref 1.7–7)
NEUTROPHILS NFR BLD AUTO: 66.4 % (ref 42.7–76)
NRBC BLD AUTO-RTO: 0 /100 WBC (ref 0–0.2)
PLATELET # BLD AUTO: 285 10*3/MM3 (ref 140–450)
POTASSIUM SERPL-SCNC: 4.1 MMOL/L (ref 3.5–5.2)
PROT SERPL-MCNC: 7.4 G/DL (ref 6–8.5)
RBC # BLD AUTO: 4.55 10*6/MM3 (ref 3.77–5.28)
SODIUM SERPL-SCNC: 141 MMOL/L (ref 136–145)
TIBC SERPL-MCNC: 349 MCG/DL
TRIGL SERPL-MCNC: 47 MG/DL (ref 0–150)
TSH SERPL DL<=0.005 MIU/L-ACNC: 0.74 UIU/ML (ref 0.27–4.2)
UIBC SERPL-MCNC: 249 MCG/DL (ref 112–346)
VLDLC SERPL CALC-MCNC: 10 MG/DL (ref 5–40)
WBC # BLD AUTO: 5.75 10*3/MM3 (ref 3.4–10.8)

## 2021-03-05 ENCOUNTER — OFFICE VISIT (OUTPATIENT)
Dept: INTERNAL MEDICINE | Facility: CLINIC | Age: 36
End: 2021-03-05

## 2021-03-05 VITALS
TEMPERATURE: 97.1 F | HEART RATE: 80 BPM | HEIGHT: 66 IN | SYSTOLIC BLOOD PRESSURE: 116 MMHG | OXYGEN SATURATION: 96 % | BODY MASS INDEX: 20.89 KG/M2 | DIASTOLIC BLOOD PRESSURE: 68 MMHG | WEIGHT: 130 LBS | RESPIRATION RATE: 16 BRPM

## 2021-03-05 DIAGNOSIS — F41.9 ANXIETY: Primary | ICD-10-CM

## 2021-03-05 PROCEDURE — 99214 OFFICE O/P EST MOD 30 MIN: CPT | Performed by: INTERNAL MEDICINE

## 2021-03-05 NOTE — ASSESSMENT & PLAN NOTE
UNCONTROLLED BUT IMPROVING  - will increase sertraline to 75 mg daily  - Reviewed potential side effects of medication including sexual performance changes, insomnia, fatigue, weight changes. Pt tolerating well without any issues.   - cont working with counselor routinely for ongoing improvements  - call back with update in 1 mos and will see in office in 5 mos if all is going well

## 2021-03-05 NOTE — PROGRESS NOTES
"Chief Complaint  Anxiety    Subjective          Aracelis Mcrae presents to [facility] for follow up on anxiety and depression. She is still currently taking the sertraline 50 mg and has been on for 6 weeks at this time. Does feel like things are slowly getting better. Still not ideal, but no more panic attacks. She has gotten established with a counselor. Sleep is ok. Still with frequent dizziness, not sure if this is medicine or anxiety.     Objective   Vital Signs:     /68   Pulse 80   Temp 97.1 °F (36.2 °C)   Resp 16   Ht 167.6 cm (66\")   Wt 59 kg (130 lb)   SpO2 96%   BMI 20.98 kg/m²     Physical Exam  Vitals signs and nursing note reviewed.   Constitutional:       General: She is not in acute distress.     Appearance: Normal appearance.   Pulmonary:      Effort: Pulmonary effort is normal. No respiratory distress.   Neurological:      Mental Status: She is alert and oriented to person, place, and time. Mental status is at baseline.   Psychiatric:         Mood and Affect: Mood is depressed. Mood is not anxious. Affect is flat.         Speech: Speech normal. Speech is not rapid and pressured.         Behavior: Behavior normal.         Thought Content: Thought content normal.         Judgment: Judgment normal.          Result Review :   The following data was reviewed by: Carla Dove MD on 03/05/2021:  CMP    CMP 2/5/21   Glucose 65   BUN 17   Creatinine 0.63   eGFR Non  Am 109   eGFR African Am 132   Sodium 141   Potassium 4.1   Chloride 103   Calcium 9.7   Total Protein 7.4   Albumin 4.80   Globulin 2.6   Total Bilirubin 0.5   Alkaline Phosphatase 88   AST (SGOT) 17   ALT (SGPT) 14      Comments are available for some flowsheets but are not being displayed.           CBC w/diff    CBC w/Diff 9/21/20 9/22/20 2/5/21   WBC 11.37 (A) 12.18 (A) 5.75   RBC 3.72 (A) 3.04 (A) 4.55   Hemoglobin 11.7 (A) 9.6 (A) 13.5   Hematocrit 34.6 29.1 (A) " 40.8   MCV 93.0 95.7 89.7   MCH 31.5 31.6 29.7   MCHC 33.8 33.0 33.1   RDW 12.0 (A) 12.2 (A) 12.6   Platelets 190 147 285   Neutrophil Rel %  74.9 66.4   Immature Granulocyte Rel %  0.8 (A)    Lymphocyte Rel %  17.3 (A) 23.7   Monocyte Rel %  5.9 7.1   Eosinophil Rel %  0.8 1.6   Basophil Rel %  0.3 1.0   (A) Abnormal value            Lipid Panel    Lipid Panel 2/5/21   Total Cholesterol 160   Triglycerides 47   HDL Cholesterol 81 (A)   VLDL Cholesterol 10   LDL Cholesterol  69   (A) Abnormal value       Comments are available for some flowsheets but are not being displayed.           TSH    TSH 2/5/21   TSH 0.744           Most Recent A1C    HGBA1C Most Recent 2/5/21   Hemoglobin A1C 5.40      Comments are available for some flowsheets but are not being displayed.              Assessment and Plan      Diagnoses and all orders for this visit:    1. Anxiety (Primary)  Assessment & Plan:  UNCONTROLLED BUT IMPROVING  - will increase sertraline to 75 mg daily  - Reviewed potential side effects of medication including sexual performance changes, insomnia, fatigue, weight changes. Pt tolerating well without any issues.   - cont working with counselor routinely for ongoing improvements  - call back with update in 1 mos and will see in office in 5 mos if all is going well    Orders:  -     sertraline (ZOLOFT) 50 MG tablet; Take 1.5 tablets by mouth Daily.  Dispense: 90 tablet; Refill: 1    Follow Up   Return in about 5 months (around 8/5/2021) for follow up.    Patient was given instructions and counseling regarding her condition or for health maintenance advice. Please see specific information pulled into the AVS if appropriate.     Yolis Dove MD  [facility] [place] [facility]  Phone: [phone]  Fax: [phone]

## 2021-08-06 ENCOUNTER — OFFICE VISIT (OUTPATIENT)
Dept: INTERNAL MEDICINE | Facility: CLINIC | Age: 36
End: 2021-08-06

## 2021-08-06 VITALS
RESPIRATION RATE: 16 BRPM | DIASTOLIC BLOOD PRESSURE: 74 MMHG | OXYGEN SATURATION: 98 % | HEART RATE: 86 BPM | HEIGHT: 66 IN | SYSTOLIC BLOOD PRESSURE: 112 MMHG | TEMPERATURE: 96.6 F | WEIGHT: 128 LBS | BODY MASS INDEX: 20.57 KG/M2

## 2021-08-06 DIAGNOSIS — F41.9 ANXIETY: Primary | ICD-10-CM

## 2021-08-06 PROCEDURE — 99214 OFFICE O/P EST MOD 30 MIN: CPT | Performed by: INTERNAL MEDICINE

## 2021-08-06 NOTE — PROGRESS NOTES
"Chief Complaint  Follow-up, Med Refill, and Anxiety    Subjective          Aracelis Mcrae presents to [facility] for follow up and med refills. Has had uncontrolled anxiety snice the birth of her son in the post-partum state, has restarted her sertraline and had dose titrated up to 75 mg. She actually went back down to the 50 mg over past couple months as the side effects got worse but the benefits weren't as noticeable. She is not having panic attacks but notes she is still not where she would like to be. She is in the process of weaning her son from breastfeeding and wants to wait until that process is over and see if things naturally improve, if not then would like to address med changes.     Objective   Vital Signs:     /74   Pulse 86   Temp 96.6 °F (35.9 °C)   Resp 16   Ht 167.6 cm (66\")   Wt 58.1 kg (128 lb)   SpO2 98%   BMI 20.66 kg/m²     Physical Exam  Vitals and nursing note reviewed.   Constitutional:       General: She is not in acute distress.     Appearance: Normal appearance.   Pulmonary:      Effort: Pulmonary effort is normal. No respiratory distress.   Neurological:      Mental Status: She is alert and oriented to person, place, and time. Mental status is at baseline.   Psychiatric:         Mood and Affect: Mood is anxious. Mood is not depressed. Affect is flat.         Behavior: Behavior normal. Behavior is cooperative.         Thought Content: Thought content normal.         Cognition and Memory: Cognition normal.         Judgment: Judgment normal.        Result Review : : None     Assessment and Plan      Diagnoses and all orders for this visit:    1. Anxiety (Primary)  Assessment & Plan:  UNCONTROLLED but STABLE  - for now, will cont sertraline 50 mg daily for anxiety control--> refills sent  - will monitor closely through breastfeeding weaning over next couple months and then watch from improvement after hormonal settling  - pt to " call back if symptoms not improving or settling after BF ends and will discuss med changes  - pt has been on lexapro in the past with similar side effect profile but better efficacy, notes it was a different time in life so not sure it would still have the same effect, but if she was to change meds, would recommend transitioning back over to lexapro  - if patient calls, would send in lexapro 10 mg tabs with instructions to take 1/2 tab daily x 3-4 weeks iwht option to increase to 10 mg daily at that time if not fully effective and then follow up with me when I am back from maternity leave  - cont working with counselor routinely  - Reviewed potential side effects of medication including sexual performance changes, insomnia, fatigue, weight changes. Pt tolerating well without any issues.       Orders:  -     sertraline (ZOLOFT) 50 MG tablet; Take 1 tablet by mouth Daily.  Dispense: 90 tablet; Refill: 1    Follow Up   Return in about 4 months (around 12/6/2021) for follow up anxiety.    Patient was given instructions and counseling regarding her condition or for health maintenance advice. Please see specific information pulled into the AVS if appropriate.     Yolis Dove MD  [facility] [place] [facility]  Phone: [phone]  Fax: [phone]

## 2021-08-06 NOTE — ASSESSMENT & PLAN NOTE
UNCONTROLLED but STABLE  - for now, will cont sertraline 50 mg daily for anxiety control--> refills sent  - will monitor closely through breastfeeding weaning over next couple months and then watch from improvement after hormonal settling  - pt to call back if symptoms not improving or settling after BF ends and will discuss med changes  - pt has been on lexapro in the past with similar side effect profile but better efficacy, notes it was a different time in life so not sure it would still have the same effect, but if she was to change meds, would recommend transitioning back over to lexapro  - if patient calls, would send in lexapro 10 mg tabs with instructions to take 1/2 tab daily x 3-4 weeks iwht option to increase to 10 mg daily at that time if not fully effective and then follow up with me when I am back from maternity leave  - cont working with counselor routinely  - Reviewed potential side effects of medication including sexual performance changes, insomnia, fatigue, weight changes. Pt tolerating well without any issues.

## 2021-12-09 ENCOUNTER — OFFICE VISIT (OUTPATIENT)
Dept: INTERNAL MEDICINE | Facility: CLINIC | Age: 36
End: 2021-12-09

## 2021-12-09 VITALS
BODY MASS INDEX: 21.53 KG/M2 | HEART RATE: 59 BPM | HEIGHT: 66 IN | SYSTOLIC BLOOD PRESSURE: 100 MMHG | RESPIRATION RATE: 16 BRPM | OXYGEN SATURATION: 98 % | WEIGHT: 134 LBS | DIASTOLIC BLOOD PRESSURE: 64 MMHG | TEMPERATURE: 97.7 F

## 2021-12-09 DIAGNOSIS — F41.9 ANXIETY: Primary | ICD-10-CM

## 2021-12-09 PROCEDURE — 99214 OFFICE O/P EST MOD 30 MIN: CPT | Performed by: INTERNAL MEDICINE

## 2021-12-09 RX ORDER — ESCITALOPRAM OXALATE 20 MG/1
20 TABLET ORAL DAILY
Qty: 90 TABLET | Refills: 1 | Status: SHIPPED | OUTPATIENT
Start: 2021-12-09 | End: 2022-03-15 | Stop reason: SDUPTHER

## 2021-12-09 NOTE — PROGRESS NOTES
"Chief Complaint  Follow-up, Med Refill, and Anxiety    Subjective          Aracelis Mcrae presents to [facility] for ANXIETY. Currently taking sertraline 50 mg dose and notes things have been \"ok.\" She was previously on lexapro and this was stopped when she was trying to get pregnant, ended up needing SSRI therapy during pregnancy so was placed on sertraline related to safety. Would like to switch back to lexapro since she seemed to do better on this previously.     Objective   Vital Signs:     /64   Pulse 59   Temp 97.7 °F (36.5 °C)   Resp 16   Ht 167.6 cm (66\")   Wt 60.8 kg (134 lb)   SpO2 98%   BMI 21.63 kg/m²     Physical Exam  Vitals and nursing note reviewed.   Constitutional:       General: She is not in acute distress.     Appearance: Normal appearance.   Pulmonary:      Effort: Pulmonary effort is normal. No respiratory distress.   Neurological:      Mental Status: She is alert and oriented to person, place, and time. Mental status is at baseline.   Psychiatric:         Mood and Affect: Mood normal. Mood is not anxious or depressed.         Speech: Speech normal.         Behavior: Behavior normal. Behavior is cooperative.         Thought Content: Thought content normal.         Cognition and Memory: Cognition normal.         Judgment: Judgment normal.          Result Review : : None     Assessment and Plan      Diagnoses and all orders for this visit:    1. Anxiety (Primary)  Assessment & Plan:  UNCONTROLLED  - will change her back to lexapro for improve tolerance and efficacy  - stop sertraline and start lexapro at 10 mg dose, can increase to 20 mg after 3 weeks if effects are not at desired level  - Reviewed potential side effects of medication including sexual performance changes, insomnia, fatigue, weight changes. Pt tolerating well without any issues.       Orders:  -     escitalopram (Lexapro) 20 MG tablet; Take 1 tablet by mouth Daily.  " Dispense: 90 tablet; Refill: 1    Follow Up   Return in about 3 months (around 3/9/2022) for follow up anxiety.    Patient was given instructions and counseling regarding her condition or for health maintenance advice. Please see specific information pulled into the AVS if appropriate.     Yolis Dove MD  [facility] [place] [facility]  Phone: [phone]  Fax: [phone]

## 2021-12-09 NOTE — ASSESSMENT & PLAN NOTE
UNCONTROLLED  - will change her back to lexapro for improve tolerance and efficacy  - stop sertraline and start lexapro at 10 mg dose, can increase to 20 mg after 3 weeks if effects are not at desired level  - Reviewed potential side effects of medication including sexual performance changes, insomnia, fatigue, weight changes. Pt tolerating well without any issues.

## 2022-03-15 ENCOUNTER — OFFICE VISIT (OUTPATIENT)
Dept: INTERNAL MEDICINE | Facility: CLINIC | Age: 37
End: 2022-03-15

## 2022-03-15 VITALS
HEART RATE: 89 BPM | RESPIRATION RATE: 16 BRPM | DIASTOLIC BLOOD PRESSURE: 62 MMHG | OXYGEN SATURATION: 99 % | BODY MASS INDEX: 22.98 KG/M2 | TEMPERATURE: 96.8 F | HEIGHT: 66 IN | SYSTOLIC BLOOD PRESSURE: 104 MMHG | WEIGHT: 143 LBS

## 2022-03-15 DIAGNOSIS — F41.9 ANXIETY: Primary | ICD-10-CM

## 2022-03-15 DIAGNOSIS — M62.08 RECTUS DIASTASIS OF LOWER ABDOMEN: ICD-10-CM

## 2022-03-15 PROCEDURE — 99213 OFFICE O/P EST LOW 20 MIN: CPT | Performed by: INTERNAL MEDICINE

## 2022-03-15 RX ORDER — ESCITALOPRAM OXALATE 20 MG/1
20 TABLET ORAL DAILY
Qty: 90 TABLET | Refills: 1 | Status: SHIPPED | OUTPATIENT
Start: 2022-03-15 | End: 2023-02-15

## 2022-03-15 NOTE — PROGRESS NOTES
"Chief Complaint  Follow-up and Anxiety    Subjective          Aracelis Mcrae presents to [facility] for follow up on anxiety and medication refills. She is still taking the lexapro and feels like this has made a positive improvement for her compared to the zoloft. Zoloft was helpful and stopped the panic attacks, but lexapro gives her better emotional control and more normal energy level. No side effects.   Still working with therapist and thinks this is helpful for her.     Objective   Vital Signs:     /62   Pulse 89   Temp 96.8 °F (36 °C)   Resp 16   Ht 167.6 cm (66\")   Wt 64.9 kg (143 lb)   SpO2 99%   BMI 23.08 kg/m²     Physical Exam  Vitals and nursing note reviewed.   Constitutional:       General: She is not in acute distress.     Appearance: Normal appearance.   Pulmonary:      Effort: Pulmonary effort is normal. No respiratory distress.   Neurological:      Mental Status: She is alert and oriented to person, place, and time. Mental status is at baseline.   Psychiatric:         Mood and Affect: Mood normal. Mood is not anxious or depressed. Affect is not tearful.         Speech: Speech normal.         Thought Content: Thought content normal.         Cognition and Memory: Cognition normal.         Judgment: Judgment normal.          Result Review : : None    Assessment and Plan      Diagnoses and all orders for this visit:    1. Anxiety (Primary)  Assessment & Plan:  CONTROLLED  - cont on lexapro at current dose of 20 mg--> refills sent  - previously on zoloft, no side effects but not as effective from a mood control standpoint  - Reviewed potential side effects of medication including sexual performance changes, insomnia, fatigue, weight changes. Pt tolerating well without any issues.   - cont working with therapist every 2 weeks      Orders:  -     escitalopram (Lexapro) 20 MG tablet; Take 1 tablet by mouth Daily.  Dispense: 90 tablet; Refill: " 1    2. Rectus diastasis of lower abdomen  -     Ambulatory Referral to Physical Therapy Evaluate and treat      Follow Up   Return in about 6 months (around 9/15/2022) for Annual physical.    Patient was given instructions and counseling regarding her condition or for health maintenance advice. Please see specific information pulled into the AVS if appropriate.     Yolis Dove MD  [facility] [place] [facility]  Phone: [phone]  Fax: [phone]

## 2022-03-15 NOTE — ASSESSMENT & PLAN NOTE
CONTROLLED  - cont on lexapro at current dose of 20 mg--> refills sent  - previously on zoloft, no side effects but not as effective from a mood control standpoint  - Reviewed potential side effects of medication including sexual performance changes, insomnia, fatigue, weight changes. Pt tolerating well without any issues.   - cont working with therapist every 2 weeks

## 2022-12-12 NOTE — H&P
-- DO NOT REPLY / DO NOT REPLY ALL --  -- Message is from [facility] [employer] ([facility]) --    General Patient Message: Patient returning a missed call from Nurse Lianne. Would like a call back as soon as available.     Caller Information       Type Contact Phone/Fax    12/12/2022 03:54 PM CST Phone (Incoming) Abril Patel (Self) [phone] (M)        Alternative phone number: none    Can a detailed message be left? Yes    Message Turnaround: [place]:    Refer to site's KB page for routing instructions    Please give this turnaround time to the caller:   \"You can expect to receive a response 2-3 business days after your provider's clinical team reviews the message\"               [facility]  Obstetric History and Physical    Chief Complaint   Patient presents with   • Ctx's       Subjective     Patient is a 33 y.o. female  currently at 40w3d, who presents with labor.      Her prenatal care is benign.  Her previous obstetric/gynecological history is noted for is non-contributory.    The following portions of the patients history were reviewed and updated as appropriate: current medications, allergies, past medical history, past surgical history, past family history and past social history .       Prenatal Information:  Prenatal Results     Initial Prenatal Labs     Test Value Reference Range Date Time    Hemoglobin        Hematocrit        Platelets 221 10*3/mm3 140 - 500 10*3/mm3 18 0528    Rubella IgG Immune   18     Hepatitis B SAg Negative   18     Hepatitis C Ab        RPR Non-Reactive   18     ABO O   18    Rh Positive   18    Antibody Screen        HIV Non-Reactive   18     Urine Culture        Gonorrhea        Chlamydia        TSH              2nd and 3rd Trimester     Test Value Reference Range Date Time    Hemoglobin (repeated) 13.0 g/dL 11.9 - 15.5 g/dL 1828    Hematocrit (repeated) 40.5 % 35.6 - 45.5 % 18 0528    GCT        Antibody Screen (repeated) Negative   18    GTT Fasting        GTT 1 Hr        GTT 2 Hr        GTT 3 Hr        Group B Strep Positive   18           Drug Screening     Test Value Reference Range Date Time    Amphetamine Screen        Barbiturate Screen        Benzodiazepine Screen        Methadone Screen        Phencyclidine Screen        Opiates Screen        THC Screen        Cocaine Screen        Propoxyphene Screen        Buprenorphine Screen        Methamphetamine Screen        Oxycodone Screen        Tryicyclic Antidepressants Screen              Other (Risk screening)     Test Value Reference Range Date Time    Varicella IgG        Parvovirus IgG        CMV IgG         Cystic Fibrosis        Hemoglobin electrophoresis        NIPT        MSAFP-4        AFP (for NTD only)                  External Prenatal Results     Pregnancy Outside Results - Transcribed From Office Records - See Scanned Records For Details     Test Value Date Time    Hgb 13.0 g/dL 18    Hct 40.5 % 18    ABO O  18    Rh Positive  18    Antibody Screen Negative  18    Glucose Fasting GTT       Glucose Tolerance Test 1 hour       Glucose Tolerance Test 3 hour       Gonorrhea (discrete)       Chlamydia (discrete)       RPR Non-Reactive  18     VDRL       Syphilis Antibody       Rubella Immune  18     HBsAg Negative  18     Herpes Simplex Virus PCR       Herpes Simplex VIrus Culture       HIV Non-Reactive  18     Hep C RNA Quant PCR       Hep C Antibody       AFP       Group B Strep Positive  18     GBS Susceptibility to Clindamycin       GBS Susceptibility to Erythromycin       Fetal Fibronectin       Genetic Testing, Maternal Blood             Drug Screening     Test Value Date Time    Urine Drug Screen       Amphetamine Screen       Barbiturate Screen       Benzodiazepine Screen       Methadone Screen       Phencyclidine Screen       Opiates Screen       THC Screen       Cocaine Screen       Propoxyphene Screen       Buprenorphine Screen       Methamphetamine Screen       Oxycodone Screen       Tricyclic Antidepressants Screen                    Past OB History:     Obstetric History       T0      L0     SAB0   TAB0   Ectopic0   Molar0   Multiple0   Live Births0       # Outcome Date GA Lbr Jignesh/2nd Weight Sex Delivery Anes PTL Lv   1 Current                   Past Medical History: Past Medical History:   Diagnosis Date   • Anxiety    • Depression       Past Surgical History Past Surgical History:   Procedure Laterality Date   • COLONOSCOPY     • WISDOM TOOTH EXTRACTION        Family History: Family History    Problem Relation Age of Onset   • Bipolar disorder Father    • Thyroid disease Mother    • Thyroid disease Maternal Grandfather       Social History:  reports that  has never smoked. she has never used smokeless tobacco.   reports that she does not drink alcohol.   reports that she does not use drugs.        General ROS: Pertinent items are noted in HPI    Objective       Vital Signs Range for the last 24 hours  Temperature: Temp:  [98.3 °F (36.8 °C)] 98.3 °F (36.8 °C)   Temp Source: Temp src: Oral   BP: BP: (113-123)/(62-78) 113/62   Pulse: Heart Rate:  [73-81] 73   Respirations: Resp:  [18] 18   SPO2:     O2 Amount (l/min):     O2 Devices     Weight: Weight:  [76.1 kg (167 lb 12.8 oz)] 76.1 kg (167 lb 12.8 oz)     Physical Examination: General appearance - alert, well appearing, and in no distress  Chest - clear to auscultation, no wheezes, rales or rhonchi, symmetric air entry  Heart - normal rate, regular rhythm, normal S1, S2, no murmurs, rubs, clicks or gallops  Abdomen - soft, nontender, gravid, no masses or organomegaly  Pelvic - normal external genitalia, vulva, vagina, cervix, uterus and adnexa    Presentation: cephalic   Cervix: Exam by: Method: sterile speculum exam per RN   Dilation: Cervical Dilation (cm): 4-5   Effacement: Cervical Effacement: 90%   Station: Fetal Station: 2-->-1       Fetal Heart Rate Assessment   Method: Fetal HR Assessment Method: external   Beats/min: Fetal HR (beats/min): 130   Baseline: Fetal Heart Baseline Rate: normal range   Variability: Fetal HR Variability: moderate (amplitude range 6 to 25 bpm)   Accels: Fetal HR Accelerations: greater than/equal to 15 bpm, lasting at least 15 seconds   Decels: Fetal HR Decelerations: early   Tracing Category:       Uterine Assessment   Method: Method: external tocotransducer   Frequency (min): Contraction Frequency (Minutes): 5-7   Ctx Count in 10 min: Contractions in 10 Minutes: 1   Duration:     Intensity: Contraction Intensity:  strong by palpation   Intensity by IUPC:     Resting Tone: Uterine Resting Tone: soft by palpation   Resting Tone by IUPC:     [place] Units:           Assessment/Plan       * No active hospital problems. *        Assessment:  1.  Intrauterine pregnancy at 40w3d gestation with reassuring fetal status.    2.  Labor  3.  Obstetrical history significant for is non-contributory.  4.  GBS status:   External Strep Group B Ag   Date Value Ref Range Status   11/12/2018 Positive  Final       Plan:  1. Admit- pt desires unmedicated birth  2. Plan of care has been reviewed with patient and family.  3.  Risks, benefits of treatment plan have been discussed.  4.  All questions have been answered.        Ninfa Au MD  12/7/2018  7:36 AM

## 2023-02-15 ENCOUNTER — OFFICE VISIT (OUTPATIENT)
Dept: INTERNAL MEDICINE | Facility: CLINIC | Age: 38
End: 2023-02-15
Payer: COMMERCIAL

## 2023-02-15 VITALS
OXYGEN SATURATION: 97 % | HEIGHT: 66 IN | DIASTOLIC BLOOD PRESSURE: 70 MMHG | TEMPERATURE: 99 F | WEIGHT: 144 LBS | RESPIRATION RATE: 18 BRPM | SYSTOLIC BLOOD PRESSURE: 106 MMHG | HEART RATE: 88 BPM | BODY MASS INDEX: 23.14 KG/M2

## 2023-02-15 DIAGNOSIS — J02.9 SORE THROAT: Primary | ICD-10-CM

## 2023-02-15 LAB
EXPIRATION DATE: NORMAL
INTERNAL CONTROL: NORMAL
Lab: NORMAL
S PYO AG THROAT QL: NEGATIVE

## 2023-02-15 PROCEDURE — 87880 STREP A ASSAY W/OPTIC: CPT | Performed by: INTERNAL MEDICINE

## 2023-02-15 PROCEDURE — 99213 OFFICE O/P EST LOW 20 MIN: CPT | Performed by: INTERNAL MEDICINE

## 2023-02-15 NOTE — PROGRESS NOTES
"Chief Complaint  Sore Throat    Subjective        Aracelis Mcrae presents to [facility]  History of Present Illness  Here with sore throat for last few days. No fevers or chills. Has school aged children at home. Did have some nasal congestion and rhinorrhea earlier in the week. Tolerating po, no rashes or joint pains.    Objective   Vital Signs:  /70   Pulse 88   Temp 99 °F (37.2 °C)   Resp 18   Ht 167.6 cm (66\")   Wt 65.3 kg (144 lb)   SpO2 97%   BMI 23.24 kg/m²   Estimated body mass index is 23.24 kg/m² as calculated from the following:    Height as of this encounter: 167.6 cm (66\").    Weight as of this encounter: 65.3 kg (144 lb).       BMI is within normal parameters. No other follow-up for BMI required.      Physical Exam  Vitals and nursing note reviewed.   Constitutional:       General: She is not in acute distress.     Appearance: Normal appearance.   HENT:      Head: Normocephalic and atraumatic.      Right Ear: Tympanic membrane and ear canal normal.      Left Ear: Tympanic membrane and ear canal normal.      Nose: Nose normal. No congestion.      Mouth/Throat:      Mouth: Mucous membranes are moist.      Pharynx: Posterior oropharyngeal erythema present. No oropharyngeal exudate.   Eyes:      Extraocular Movements: Extraocular movements intact.      Conjunctiva/sclera: Conjunctivae normal.      Pupils: Pupils are equal, round, and reactive to light.   Cardiovascular:      Rate and Rhythm: Normal rate and regular rhythm.      Pulses: Normal pulses.      Heart sounds: Normal heart sounds. No murmur heard.  Pulmonary:      Effort: Pulmonary effort is normal. No respiratory distress.      Breath sounds: Normal breath sounds. No wheezing or rales.   Abdominal:      General: Abdomen is flat. Bowel sounds are normal. There is no distension.      Palpations: Abdomen is soft.      Tenderness: There is no abdominal tenderness.   Musculoskeletal:      " Cervical back: Normal range of motion and neck supple. No rigidity.   Lymphadenopathy:      Cervical: No cervical adenopathy.   Skin:     General: Skin is warm.      Capillary Refill: Capillary refill takes less than 2 seconds.   Neurological:      General: No focal deficit present.      Mental Status: She is alert and oriented to person, place, and time. Mental status is at baseline.      Cranial Nerves: No cranial nerve deficit.   Psychiatric:         Mood and Affect: Mood normal.         Behavior: Behavior normal.         Thought Content: Thought content normal.        Result Review :  The following data was reviewed by: Jose Lanza MD on 02/15/2023:      Lab Results   Component Value Date    RAPSCRN Negative 02/15/2023         Data reviewed: prior office notes reviewed             Assessment and Plan      Aracelis Mcrae is a 37 y.o. female presenting with sore throat for last couple of days, initially with some rhinorrhea/congestion that has resolved. No fevers or systemic symptoms otherwise. GAS testing negative. Suspect viral pharyngitis, continue supportive measures. Return precautions discussed.    Diagnoses and all orders for this visit:    1. Sore throat (Primary)  -     POCT rapid strep A           I spent 20 minutes caring for Aracelis on this date of service. This time includes time spent by me in the following activities:preparing for the visit, reviewing tests, obtaining and/or reviewing a separately obtained history, performing a medically appropriate examination and/or evaluation , counseling and educating the patient/family/caregiver, ordering medications, tests, or procedures and documenting information in the medical record  Follow Up   Return for Next scheduled follow up.  Patient was given instructions and counseling regarding her condition or for health maintenance advice. Please see specific information pulled into the AVS if appropriate.

## 2023-06-05 ENCOUNTER — TELEPHONE (OUTPATIENT)
Dept: INTERNAL MEDICINE | Facility: CLINIC | Age: 38
End: 2023-06-05
Payer: COMMERCIAL

## 2023-06-08 ENCOUNTER — OFFICE VISIT (OUTPATIENT)
Dept: INTERNAL MEDICINE | Facility: CLINIC | Age: 38
End: 2023-06-08
Payer: COMMERCIAL

## 2023-06-08 VITALS
OXYGEN SATURATION: 97 % | SYSTOLIC BLOOD PRESSURE: 110 MMHG | DIASTOLIC BLOOD PRESSURE: 74 MMHG | HEIGHT: 66 IN | BODY MASS INDEX: 22.82 KG/M2 | HEART RATE: 72 BPM | TEMPERATURE: 97.4 F | WEIGHT: 142 LBS | RESPIRATION RATE: 16 BRPM

## 2023-06-08 DIAGNOSIS — R53.83 OTHER FATIGUE: Primary | ICD-10-CM

## 2023-06-08 DIAGNOSIS — R23.2 HOT FLASHES: ICD-10-CM

## 2023-06-08 DIAGNOSIS — E55.9 VITAMIN D DEFICIENCY: ICD-10-CM

## 2023-06-08 DIAGNOSIS — R68.82 LOW LIBIDO: ICD-10-CM

## 2023-06-08 DIAGNOSIS — N92.6 IRREGULAR MENSES: ICD-10-CM

## 2023-06-08 NOTE — PROGRESS NOTES
"Chief Complaint  Follow-up, Menstrual Problem, and Hot Flashes    Subjective          Aracelis Mcrae presents to [facility] for follow up and discussion of menstrual issue, hot flashes, fatigue.   Pt notes she became pregnant 9 mos ago and stopped taking the medication during that time because she was very sick and unable to keep much down. She eventually miscarried this baby and had actually done ok to stay off these medications until the past 3 mos. Has noticed more fatigue, libido changes, menstrual cycle changes,  hot flashes.       Objective   Vital Signs:     /74   Pulse 72   Temp 97.4 °F (36.3 °C)   Resp 16   Ht 167.6 cm (66\")   Wt 64.4 kg (142 lb)   SpO2 97%   BMI 22.92 kg/m²     Physical Exam  Vitals and nursing note reviewed.   Constitutional:       General: She is not in acute distress.     Appearance: Normal appearance.   Cardiovascular:      Rate and Rhythm: Normal rate and regular rhythm.      Pulses: Normal pulses.      Heart sounds: Normal heart sounds. No murmur heard.  Pulmonary:      Effort: Pulmonary effort is normal. No respiratory distress.      Breath sounds: Normal breath sounds.   Abdominal:      General: Abdomen is flat. Bowel sounds are normal.      Palpations: Abdomen is soft.      Tenderness: There is no abdominal tenderness.   Musculoskeletal:      Right lower leg: No edema.      Left lower leg: No edema.   Neurological:      Mental Status: She is alert and oriented to person, place, and time. Mental status is at baseline.   Psychiatric:         Mood and Affect: Mood normal. Mood is anxious. Affect is flat. Affect is not tearful.         Speech: Speech normal.         Behavior: Behavior normal.        Result Review :   The following data was reviewed by: Carla Dove MD on 06/08/2023:    CBC w/diff          11/3/2022    18:41 11/8/2022    11:33   CBC w/Diff   WBC 11.29     8.06       RBC 3.65     2.87       Hemoglobin " 11.3     8.8       Hematocrit 33.3     28.4       MCV 91.2     99.0       MCH 31.0     30.7       MCHC 33.9     31.0       RDW 11.6     12.4       Platelets 265     277       Neutrophil Rel % 58.3     61.2       Immature Granulocyte Rel % 0.4     0.5       Lymphocyte Rel % 34.0     30.1       Monocyte Rel % 5.2     5.0       Eosinophil Rel % 1.7     2.6       Basophil Rel % 0.4     0.6          Details          This result is from an external source.                  Assessment and Plan      Diagnoses and all orders for this visit:    1. Other fatigue (Primary)  2. Irregular menses  3. Hot flashes  4. Low libido  5. Vitamin D deficiency   - will get labs as below to rule out any organic causes of her symptoms given progression, family history, and reported abnormality from her typically anxiety/depression symptoms   - if all labs normal, have discussed getting back on lexapro to see what of her symptoms improves and then determine what symptoms we are left with that may still need further evaluation     Orders:  -     Comprehensive Metabolic Panel  -     Hemoglobin A1c  -     Iron Profile  -     Vitamin B12  -     CBC auto differential  -     Ferritin  -     DHEA-sulfate  -     Follicle stimulating hormone  -     Luteinizing hormone  -     Prolactin  -     Testosterone Free Direct  -     TSH  -     Vitamin D,25-Hydroxy      Follow Up   Return if symptoms worsen or fail to improve.    Patient was given instructions and counseling regarding her condition or for health maintenance advice. Please see specific information pulled into the AVS if appropriate.     Yolis Dove MD  [facility]  Phone: [phone]  Fax: [phone]

## 2023-06-09 LAB
25(OH)D3+25(OH)D2 SERPL-MCNC: 31.9 NG/ML (ref 30–100)
ALBUMIN SERPL-MCNC: 4.3 G/DL (ref 3.5–5.2)
ALBUMIN/GLOB SERPL: 1.7 G/DL
ALP SERPL-CCNC: 65 U/L (ref 39–117)
ALT SERPL-CCNC: 27 U/L (ref 1–33)
AST SERPL-CCNC: 19 U/L (ref 1–32)
BASOPHILS # BLD AUTO: 0.04 10*3/MM3 (ref 0–0.2)
BASOPHILS NFR BLD AUTO: 0.7 % (ref 0–1.5)
BILIRUB SERPL-MCNC: 0.3 MG/DL (ref 0–1.2)
BUN SERPL-MCNC: 13 MG/DL (ref 6–20)
BUN/CREAT SERPL: 20.3 (ref 7–25)
CALCIUM SERPL-MCNC: 9.7 MG/DL (ref 8.6–10.5)
CHLORIDE SERPL-SCNC: 105 MMOL/L (ref 98–107)
CO2 SERPL-SCNC: 28.5 MMOL/L (ref 22–29)
CREAT SERPL-MCNC: 0.64 MG/DL (ref 0.57–1)
DHEA-S SERPL-MCNC: 225 UG/DL (ref 57.3–279.2)
EGFRCR SERPLBLD CKD-EPI 2021: 117.6 ML/MIN/1.73
EOSINOPHIL # BLD AUTO: 0.12 10*3/MM3 (ref 0–0.4)
EOSINOPHIL NFR BLD AUTO: 2 % (ref 0.3–6.2)
ERYTHROCYTE [DISTWIDTH] IN BLOOD BY AUTOMATED COUNT: 11.8 % (ref 12.3–15.4)
FERRITIN SERPL-MCNC: 37.1 NG/ML (ref 13–150)
FSH SERPL-ACNC: 7 MIU/ML
GLOBULIN SER CALC-MCNC: 2.5 GM/DL
GLUCOSE SERPL-MCNC: 87 MG/DL (ref 65–99)
HBA1C MFR BLD: 5.3 % (ref 4.8–5.6)
HCT VFR BLD AUTO: 38.8 % (ref 34–46.6)
HGB BLD-MCNC: 13 G/DL (ref 12–15.9)
IMM GRANULOCYTES # BLD AUTO: 0.02 10*3/MM3 (ref 0–0.05)
IMM GRANULOCYTES NFR BLD AUTO: 0.3 % (ref 0–0.5)
IRON SATN MFR SERPL: 45 % (ref 20–50)
IRON SERPL-MCNC: 140 MCG/DL (ref 37–145)
LH SERPL-ACNC: 7.6 MIU/ML
LYMPHOCYTES # BLD AUTO: 2.22 10*3/MM3 (ref 0.7–3.1)
LYMPHOCYTES NFR BLD AUTO: 36.2 % (ref 19.6–45.3)
MCH RBC QN AUTO: 30.7 PG (ref 26.6–33)
MCHC RBC AUTO-ENTMCNC: 33.5 G/DL (ref 31.5–35.7)
MCV RBC AUTO: 91.5 FL (ref 79–97)
MONOCYTES # BLD AUTO: 0.29 10*3/MM3 (ref 0.1–0.9)
MONOCYTES NFR BLD AUTO: 4.7 % (ref 5–12)
NEUTROPHILS # BLD AUTO: 3.44 10*3/MM3 (ref 1.7–7)
NEUTROPHILS NFR BLD AUTO: 56.1 % (ref 42.7–76)
NRBC BLD AUTO-RTO: 0.2 /100 WBC (ref 0–0.2)
PLATELET # BLD AUTO: 259 10*3/MM3 (ref 140–450)
POTASSIUM SERPL-SCNC: 4.1 MMOL/L (ref 3.5–5.2)
PROLACTIN SERPL-MCNC: 8.3 NG/ML (ref 4.8–23.3)
PROT SERPL-MCNC: 6.8 G/DL (ref 6–8.5)
RBC # BLD AUTO: 4.24 10*6/MM3 (ref 3.77–5.28)
SODIUM SERPL-SCNC: 141 MMOL/L (ref 136–145)
TESTOST FREE SERPL-MCNC: 2 PG/ML (ref 0–4.2)
TIBC SERPL-MCNC: 308 MCG/DL
TSH SERPL DL<=0.005 MIU/L-ACNC: 0.64 UIU/ML (ref 0.27–4.2)
UIBC SERPL-MCNC: 168 MCG/DL (ref 112–346)
VIT B12 SERPL-MCNC: 922 PG/ML (ref 211–946)
WBC # BLD AUTO: 6.13 10*3/MM3 (ref 3.4–10.8)

## 2023-06-13 LAB
T4 FREE SERPL-MCNC: 0.97 NG/DL (ref 0.93–1.7)
WRITTEN AUTHORIZATION: NORMAL